# Patient Record
Sex: FEMALE | Race: WHITE | NOT HISPANIC OR LATINO | Employment: OTHER | URBAN - METROPOLITAN AREA
[De-identification: names, ages, dates, MRNs, and addresses within clinical notes are randomized per-mention and may not be internally consistent; named-entity substitution may affect disease eponyms.]

---

## 2017-01-31 ENCOUNTER — RECORDS - HEALTHEAST (OUTPATIENT)
Dept: LAB | Facility: CLINIC | Age: 82
End: 2017-01-31

## 2017-01-31 LAB
CHOLEST SERPL-MCNC: 230 MG/DL
FASTING STATUS PATIENT QL REPORTED: NO
HDLC SERPL-MCNC: 44 MG/DL
LDLC SERPL CALC-MCNC: 151 MG/DL
TRIGL SERPL-MCNC: 176 MG/DL

## 2018-03-05 ASSESSMENT — MIFFLIN-ST. JEOR: SCORE: 967.98

## 2018-03-06 ENCOUNTER — SURGERY - HEALTHEAST (OUTPATIENT)
Dept: SURGERY | Facility: HOSPITAL | Age: 83
End: 2018-03-06

## 2018-03-06 ENCOUNTER — ANESTHESIA - HEALTHEAST (OUTPATIENT)
Dept: SURGERY | Facility: HOSPITAL | Age: 83
End: 2018-03-06

## 2018-07-23 ENCOUNTER — RECORDS - HEALTHEAST (OUTPATIENT)
Dept: ADMINISTRATIVE | Facility: OTHER | Age: 83
End: 2018-07-23

## 2018-07-23 ENCOUNTER — RECORDS - HEALTHEAST (OUTPATIENT)
Dept: LAB | Facility: CLINIC | Age: 83
End: 2018-07-23

## 2018-07-23 LAB
CHOLEST SERPL-MCNC: 248 MG/DL
FASTING STATUS PATIENT QL REPORTED: ABNORMAL
HDLC SERPL-MCNC: 49 MG/DL
LDLC SERPL CALC-MCNC: 169 MG/DL
TRIGL SERPL-MCNC: 152 MG/DL
TSH SERPL DL<=0.005 MIU/L-ACNC: 2.56 UIU/ML (ref 0.3–5)

## 2018-07-24 LAB — BACTERIA SPEC CULT: NO GROWTH

## 2018-09-12 ENCOUNTER — HOSPITAL ENCOUNTER (OUTPATIENT)
Dept: MAMMOGRAPHY | Facility: CLINIC | Age: 83
Discharge: HOME | End: 2018-09-12
Attending: FAMILY MEDICINE

## 2018-09-12 DIAGNOSIS — Z12.31 VISIT FOR SCREENING MAMMOGRAM: ICD-10-CM

## 2019-04-03 ENCOUNTER — SURGERY - HEALTHEAST (OUTPATIENT)
Dept: GASTROENTEROLOGY | Facility: HOSPITAL | Age: 84
End: 2019-04-03

## 2019-04-03 ASSESSMENT — MIFFLIN-ST. JEOR: SCORE: 946.44

## 2019-11-19 ENCOUNTER — RECORDS - HEALTHEAST (OUTPATIENT)
Dept: LAB | Facility: CLINIC | Age: 84
End: 2019-11-19

## 2019-11-19 LAB
ANION GAP SERPL CALCULATED.3IONS-SCNC: 10 MMOL/L (ref 5–18)
BUN SERPL-MCNC: 18 MG/DL (ref 8–28)
CALCIUM SERPL-MCNC: 10.6 MG/DL (ref 8.5–10.5)
CHLORIDE BLD-SCNC: 105 MMOL/L (ref 98–107)
CO2 SERPL-SCNC: 26 MMOL/L (ref 22–31)
CREAT SERPL-MCNC: 0.94 MG/DL (ref 0.6–1.1)
GFR SERPL CREATININE-BSD FRML MDRD: 57 ML/MIN/1.73M2
GLUCOSE BLD-MCNC: 102 MG/DL (ref 70–125)
POTASSIUM BLD-SCNC: 4.3 MMOL/L (ref 3.5–5)
SODIUM SERPL-SCNC: 141 MMOL/L (ref 136–145)
TSH SERPL DL<=0.005 MIU/L-ACNC: 3.8 UIU/ML (ref 0.3–5)

## 2019-11-29 ENCOUNTER — RECORDS - HEALTHEAST (OUTPATIENT)
Dept: ADMINISTRATIVE | Facility: OTHER | Age: 84
End: 2019-11-29

## 2019-11-29 ENCOUNTER — AMBULATORY - HEALTHEAST (OUTPATIENT)
Dept: CARDIOLOGY | Facility: CLINIC | Age: 84
End: 2019-11-29

## 2019-12-04 ENCOUNTER — OFFICE VISIT - HEALTHEAST (OUTPATIENT)
Dept: CARDIOLOGY | Facility: CLINIC | Age: 84
End: 2019-12-04

## 2019-12-04 ENCOUNTER — COMMUNICATION - HEALTHEAST (OUTPATIENT)
Dept: CARDIOLOGY | Facility: CLINIC | Age: 84
End: 2019-12-04

## 2019-12-04 DIAGNOSIS — I49.3 FREQUENT PVCS: ICD-10-CM

## 2019-12-04 RX ORDER — MULTIVIT-MIN/IRON FUM/FOLIC AC 7.5 MG-4
1 TABLET ORAL DAILY
Status: SHIPPED | COMMUNITY
Start: 2019-08-12

## 2019-12-04 RX ORDER — DIAZEPAM 5 MG
5-10 TABLET ORAL ONCE
Qty: 2 TABLET | Refills: 0 | Status: SHIPPED | OUTPATIENT
Start: 2019-12-04 | End: 2024-01-08

## 2019-12-04 ASSESSMENT — MIFFLIN-ST. JEOR: SCORE: 977.06

## 2019-12-05 ENCOUNTER — HOSPITAL ENCOUNTER (OUTPATIENT)
Dept: CARDIOLOGY | Facility: HOSPITAL | Age: 84
Discharge: HOME | End: 2019-12-05
Attending: INTERNAL MEDICINE

## 2019-12-05 DIAGNOSIS — I49.3 FREQUENT PVCS: ICD-10-CM

## 2019-12-09 ENCOUNTER — COMMUNICATION - HEALTHEAST (OUTPATIENT)
Dept: CARDIOLOGY | Facility: CLINIC | Age: 84
End: 2019-12-09

## 2019-12-18 ENCOUNTER — COMMUNICATION - HEALTHEAST (OUTPATIENT)
Dept: CARDIOLOGY | Facility: CLINIC | Age: 84
End: 2019-12-18

## 2019-12-31 ENCOUNTER — HOSPITAL ENCOUNTER (OUTPATIENT)
Dept: MRI IMAGING | Facility: HOSPITAL | Age: 84
Discharge: HOME | End: 2019-12-31
Attending: INTERNAL MEDICINE

## 2020-01-08 ENCOUNTER — COMMUNICATION - HEALTHEAST (OUTPATIENT)
Dept: CARDIOLOGY | Facility: HOSPITAL | Age: 85
End: 2020-01-08

## 2020-01-09 ENCOUNTER — HOSPITAL ENCOUNTER (OUTPATIENT)
Dept: MRI IMAGING | Facility: HOSPITAL | Age: 85
Discharge: HOME | End: 2020-01-09
Attending: INTERNAL MEDICINE

## 2020-01-09 DIAGNOSIS — I49.3 FREQUENT PVCS: ICD-10-CM

## 2020-01-09 LAB
ATRIAL RATE - MUSE: 69 BPM
CREAT BLD-MCNC: 0.9 MG/DL (ref 0.6–1.1)
DIASTOLIC BLOOD PRESSURE - MUSE: NORMAL
GFR SERPL CREATININE-BSD FRML MDRD: 60 ML/MIN/1.73M2
INTERPRETATION ECG - MUSE: NORMAL
P AXIS - MUSE: 38 DEGREES
PR INTERVAL - MUSE: 180 MS
QRS DURATION - MUSE: 74 MS
QT - MUSE: 384 MS
QTC - MUSE: 411 MS
R AXIS - MUSE: -1 DEGREES
SYSTOLIC BLOOD PRESSURE - MUSE: NORMAL
T AXIS - MUSE: 42 DEGREES
VENTRICULAR RATE- MUSE: 69 BPM

## 2020-01-10 LAB
ATRIAL RATE - MUSE: 75 BPM
CCTA EJECTION FRACTION: 57 %
CCTA INTERVENTRICULAR SETPUM: 0.6 CM (ref 0.6–1.1)
CCTA LEFT INTERNAL DIMENSION IN SYSTOLE: 2.4 CM (ref 2.1–4)
CCTA LEFT VENTRICULAR INTERNAL DIMENSION IN DIASTOLE: 3.6 CM (ref 3.5–6)
CCTA LEFT VENTRICULAR MASS: 53.79 G
CCTA POSTERIOR WALL: 0.6 CM (ref 0.6–1.1)
DIASTOLIC BLOOD PRESSURE - MUSE: NORMAL
INTERPRETATION ECG - MUSE: NORMAL
MR CARDIAC LEFT VENTRIAL CARDIAC INDEX: 2 L/MIN/M2 (ref 1.75–3.8)
MR CARDIAC LEFT VENTRICAL CARDIAC OUTPUT: 3.1 L/MIN (ref 2.8–8.8)
MR CARDIAC LEFT VENTRICULAR DIASTOLIC VOLUME INDEX: 55.3 ML/M2 (ref 41–81)
MR CARDIAC LEFT VENTRICULAR MASS INDEX: 47.68 G/M2 (ref 63–95)
MR CARDIAC LEFT VENTRICULAR MASS: 75 G (ref 75–175)
MR CARDIAC LEFT VENTRICULAR STROKE VOLUME INDEX: 30.51 ML/M2 (ref 26–56)
MR CARDIAC LEFT VENTRICULAR SYSTOLIC VOLUME INDEX: 24.79 ML/M2 (ref 12–20)
MR EJECTION FRACTION: 55.17 %
MR HEIGHT: 1.55 M
MR LEFT VENTRICULAR DYSTOLIC VOLUMEN: 87 ML (ref 52–141)
MR LEFT VENTRICULAR STROKE VOLUMEN: 48 ML (ref 33–97)
MR LEFT VENTRICULAR SYSTOLIC VOLUME: 39 ML (ref 13–51)
MR WEIGHT: 59 KG
P AXIS - MUSE: 35 DEGREES
PR INTERVAL - MUSE: 174 MS
QRS DURATION - MUSE: 80 MS
QT - MUSE: 410 MS
QTC - MUSE: 457 MS
R AXIS - MUSE: 0 DEGREES
SYSTOLIC BLOOD PRESSURE - MUSE: NORMAL
T AXIS - MUSE: 43 DEGREES
VENTRICULAR RATE- MUSE: 75 BPM

## 2020-11-03 ENCOUNTER — COMMUNICATION - HEALTHEAST (OUTPATIENT)
Dept: CARDIOLOGY | Facility: CLINIC | Age: 85
End: 2020-11-03

## 2020-11-04 ENCOUNTER — COMMUNICATION - HEALTHEAST (OUTPATIENT)
Dept: CARDIOLOGY | Facility: CLINIC | Age: 85
End: 2020-11-04

## 2020-11-04 ENCOUNTER — OFFICE VISIT - HEALTHEAST (OUTPATIENT)
Dept: CARDIOLOGY | Facility: CLINIC | Age: 85
End: 2020-11-04

## 2020-11-04 DIAGNOSIS — E78.2 MIXED HYPERLIPIDEMIA: ICD-10-CM

## 2020-11-04 DIAGNOSIS — I49.3 PVC'S (PREMATURE VENTRICULAR CONTRACTIONS): ICD-10-CM

## 2020-11-04 DIAGNOSIS — R00.1 BRADYCARDIA: ICD-10-CM

## 2020-11-04 LAB
CHOLEST SERPL-MCNC: 238 MG/DL
FASTING STATUS PATIENT QL REPORTED: NO
HDLC SERPL-MCNC: 47 MG/DL
LDLC SERPL CALC-MCNC: 156 MG/DL
MAGNESIUM SERPL-MCNC: 2.3 MG/DL (ref 1.8–2.6)
TRIGL SERPL-MCNC: 177 MG/DL

## 2020-11-04 ASSESSMENT — MIFFLIN-ST. JEOR: SCORE: 977.06

## 2020-11-06 ENCOUNTER — COMMUNICATION - HEALTHEAST (OUTPATIENT)
Dept: CARDIOLOGY | Facility: CLINIC | Age: 85
End: 2020-11-06

## 2020-11-11 ENCOUNTER — HOSPITAL ENCOUNTER (OUTPATIENT)
Dept: CT IMAGING | Facility: CLINIC | Age: 85
Discharge: HOME | End: 2020-11-11
Attending: INTERNAL MEDICINE

## 2020-11-11 DIAGNOSIS — E78.2 MIXED HYPERLIPIDEMIA: ICD-10-CM

## 2020-11-11 LAB
CV CALCIUM SCORE AGATSTON LM: 0
CV CALCIUM SCORING AGATSON LAD: 102
CV CALCIUM SCORING AGATSTON CX: 0
CV CALCIUM SCORING AGATSTON RCA: 0
CV CALCIUM SCORING AGATSTON TOTAL: 102

## 2020-11-12 ENCOUNTER — AMBULATORY - HEALTHEAST (OUTPATIENT)
Dept: CARDIOLOGY | Facility: CLINIC | Age: 85
End: 2020-11-12

## 2020-11-12 DIAGNOSIS — E78.2 MIXED HYPERLIPIDEMIA: ICD-10-CM

## 2020-11-12 RX ORDER — UBIDECARENONE 100 MG
100 CAPSULE ORAL 2 TIMES DAILY
Refills: 0 | Status: SHIPPED | COMMUNITY
Start: 2020-11-12

## 2020-11-30 ENCOUNTER — RECORDS - HEALTHEAST (OUTPATIENT)
Dept: LAB | Facility: CLINIC | Age: 85
End: 2020-11-30

## 2020-11-30 LAB
ALBUMIN SERPL-MCNC: 4.2 G/DL (ref 3.5–5)
ALP SERPL-CCNC: 95 U/L (ref 45–120)
ALT SERPL W P-5'-P-CCNC: 21 U/L (ref 0–45)
ANION GAP SERPL CALCULATED.3IONS-SCNC: 7 MMOL/L (ref 5–18)
AST SERPL W P-5'-P-CCNC: 19 U/L (ref 0–40)
BILIRUB SERPL-MCNC: 0.5 MG/DL (ref 0–1)
BUN SERPL-MCNC: 12 MG/DL (ref 8–28)
CALCIUM SERPL-MCNC: 9.9 MG/DL (ref 8.5–10.5)
CHLORIDE BLD-SCNC: 106 MMOL/L (ref 98–107)
CHOLEST SERPL-MCNC: 128 MG/DL
CO2 SERPL-SCNC: 27 MMOL/L (ref 22–31)
CREAT SERPL-MCNC: 0.8 MG/DL (ref 0.6–1.1)
FASTING STATUS PATIENT QL REPORTED: YES
GFR SERPL CREATININE-BSD FRML MDRD: >60 ML/MIN/1.73M2
GLUCOSE BLD-MCNC: 120 MG/DL (ref 70–125)
HDLC SERPL-MCNC: 45 MG/DL
LDLC SERPL CALC-MCNC: 60 MG/DL
POTASSIUM BLD-SCNC: 4.3 MMOL/L (ref 3.5–5)
PROT SERPL-MCNC: 6.7 G/DL (ref 6–8)
SODIUM SERPL-SCNC: 140 MMOL/L (ref 136–145)
TRIGL SERPL-MCNC: 114 MG/DL

## 2021-03-11 ENCOUNTER — RECORDS - HEALTHEAST (OUTPATIENT)
Dept: LAB | Facility: CLINIC | Age: 86
End: 2021-03-11

## 2021-03-11 LAB
ERYTHROCYTE [SEDIMENTATION RATE] IN BLOOD BY WESTERGREN METHOD: 8 MM/HR (ref 0–20)
TSH SERPL DL<=0.005 MIU/L-ACNC: 4.78 UIU/ML (ref 0.3–5)

## 2021-05-28 ENCOUNTER — RECORDS - HEALTHEAST (OUTPATIENT)
Dept: ADMINISTRATIVE | Facility: CLINIC | Age: 86
End: 2021-05-28

## 2021-06-01 VITALS — BODY MASS INDEX: 24.17 KG/M2 | WEIGHT: 128 LBS | HEIGHT: 61 IN

## 2021-06-02 ENCOUNTER — RECORDS - HEALTHEAST (OUTPATIENT)
Dept: ADMINISTRATIVE | Facility: CLINIC | Age: 86
End: 2021-06-02

## 2021-06-02 VITALS — WEIGHT: 125 LBS | HEIGHT: 61 IN | BODY MASS INDEX: 23.6 KG/M2

## 2021-06-04 VITALS
SYSTOLIC BLOOD PRESSURE: 106 MMHG | BODY MASS INDEX: 24.55 KG/M2 | HEART RATE: 44 BPM | RESPIRATION RATE: 16 BRPM | HEIGHT: 61 IN | WEIGHT: 130 LBS | DIASTOLIC BLOOD PRESSURE: 56 MMHG

## 2021-06-04 NOTE — TELEPHONE ENCOUNTER
Called and spoke with the patient. Discussion of her Holter monitor results. Pt reports MRI coming up on 12/31/19. Pt raises concerns and needs input from provider.    Patient has down payment on a trip to [place]. The trip will be mid Feb to Mid March for an entire month. A large sum of money. She has until 12/21/19 to cancel and not lose her deposit. She is wondering if she should cancel.     Writer will call MRI to see if can obtain/get scheduled sooner at [facility]. If not at [facility] then will check with [facility].     Dr Banks I will work on obtaining the MRI ST sooner, if taht is possible. Patient is anxious and wanting recommendation in regards to her upcoming trip. Please advise. THank you, NEEMA,RN

## 2021-06-04 NOTE — TELEPHONE ENCOUNTER
Spoke with the patient and informed of input from [facility]. Pt verbalized understanding. Will continue to work on sooner MRI ST. NEEMARn

## 2021-06-04 NOTE — TELEPHONE ENCOUNTER
===View-only below this line===  ----- Message -----  From: Sarah Banks MD  Sent: 12/11/2019   2:25 PM CST  To: Tyrone Alan RN    I think that she probably will be able to go. Can she buy trip insurance?    EG  ----

## 2021-06-04 NOTE — TELEPHONE ENCOUNTER
----- Message from Kati Alarcon sent at 12/17/2019  4:11 PM CST -----  THIS CAME ON THE HIS FAX FOR  TO FILL OUT.

## 2021-06-04 NOTE — TELEPHONE ENCOUNTER
Update from scheduling, unable to reschedule patient for a sooner MRI ST. Will have to connect with [place] to see if possible to be scheduled sooner through a [place] facility. Unable to schedule sooner. NEEMA,RN

## 2021-06-04 NOTE — TELEPHONE ENCOUNTER
"Phone call to patient - informed her of Dr. Banks's Rx order for Valium to take prior to cardiac MRI - reviewed instructions that she should take 1-2 (5mg) tabs at time of appt and will need  for appt - patient verbalized understanding and confirmed preferred pharmacy.    Left detailed message for [facility] pharmacist with Dr. Banks's verbal order for Valium 5mg tabs w/instructions to take 1-2 tabs po \"once in imaging for anxiety - bring Rx to MRI appt , don't take prior to arriving - will need \" - requested call back to confirm receipt of message.  mg  "
Rec'd return call from [facility] pharmacist confirming receipt of Dr. Banks's verbal order for Valium Rx - no request for further information rec'd.  mg  
I will SWITCH the dose or number of times a day I take the medications listed below when I get home from the hospital:  None

## 2021-06-04 NOTE — TELEPHONE ENCOUNTER
Called and spoke with central scheduling and was instructed and forwarded to follow up with [facility]  specifically. Will call [facility]  to see if an alternate date/time available at [facility]. Called and spoke with , she will look into availability and return to writer once further information obtained. NEEMARn

## 2021-06-04 NOTE — TELEPHONE ENCOUNTER
----- Message from Lauren Murray sent at 12/9/2019  1:13 PM CST -----  Regarding: [facility] Pt  General phone call:    Caller: Tayla   Primary cardiologist: [facility]  Detailed reason for call: Tayla states she recently had a monitor on and was curious if [facility] was able to look at the results. Patient has rented out a home in [place] in February and is wondering [facility] recommendations if she will be able to go to [place] or if she needs to cancel her trip.    Best phone number: [phone]  Best time to contact: any  Ok to leave a detailed message? yes  Device? no    Additional Info:

## 2021-06-04 NOTE — PATIENT INSTRUCTIONS - [facility]
- You have frequent PVCs, we want to know how many and why.    - 24 hour monitor to see how many extra beats you are having    - Cardiac MR stress test to check the blood flow to your heart and look for inflammation of the heart muscle.     - Will call with results and recommendations

## 2021-06-05 VITALS
HEART RATE: 85 BPM | RESPIRATION RATE: 16 BRPM | WEIGHT: 130 LBS | SYSTOLIC BLOOD PRESSURE: 120 MMHG | BODY MASS INDEX: 24.55 KG/M2 | HEIGHT: 61 IN | DIASTOLIC BLOOD PRESSURE: 80 MMHG

## 2021-06-07 ENCOUNTER — COMMUNICATION - HEALTHEAST (OUTPATIENT)
Dept: CARDIOLOGY | Facility: CLINIC | Age: 86
End: 2021-06-07

## 2021-06-07 DIAGNOSIS — E78.2 MIXED HYPERLIPIDEMIA: ICD-10-CM

## 2021-06-08 RX ORDER — ATORVASTATIN CALCIUM 20 MG/1
20 TABLET, FILM COATED ORAL AT BEDTIME
Qty: 90 TABLET | Refills: 2 | Status: SHIPPED | OUTPATIENT
Start: 2021-06-08 | End: 2022-04-11

## 2021-06-12 NOTE — TELEPHONE ENCOUNTER
Wellness Screening Tool  Symptom Screening:  Do you have one of the following NEW symptoms:    Fever (subjective or >100.0)?  No    A new cough?  No    Shortness of breath?  No     Chills? No     New loss of taste or smell? No     Generalized body aches? No     New persistent headache? No     New sore throat? No     Nausea, vomiting, or diarrhea?  No    Within the past 2 weeks, have you been exposed to someone with a known positive illness below:    COVID-19 (known or suspected)?  No    Chicken pox?  No    Mealses?  No    Pertussis?  No    Patient notified of visitor policy- They may have one person accompany them to their appointment, but they will need to wear a mask and will be screened upon arrival for symptoms: Yes  Pt informed to wear a mask: Yes  Pt notified if they develop any symptoms listed above, prior to their appointment, they are to call the clinic directly at [phone] for further instructions.  Yes  Patient's appointment status: 11/4

## 2021-06-12 NOTE — TELEPHONE ENCOUNTER
Spoke with pt and updated her on LDL and waiting for Ca Score testing to be done next week. Also updated Magnesium and how well that is. Pt agreed with plan.

## 2021-06-12 NOTE — TELEPHONE ENCOUNTER
----- Message from Yolanda Phillips sent at 11/6/2020 10:57 AM CST -----      Caller: Patient  Primary cardiologist: Dr. Banks    Detailed reason for call: Patient is wondering about her labs and if the results are back yet    Best phone number: [phone]  Best time to contact: today  Ok to leave a detailed message? yes  Device? no

## 2021-06-13 NOTE — PROGRESS NOTES
===View-only below this line===  ----- Message -----  From: Sarah Banks MD  Sent: 11/12/2020   9:43 AM CST  To: Tyrone Alan RN    CTA shows a calcium score of > 100 (102), which does put her in a high risk group from a cardiovascular standpoint. Based on this and her high LDL I recommend we start atorvastatin 40 mg at bedtime and a baby aspirin 81 mg daily.  She can use Coenzyme Q10 100mg two times a day if she notes muscle aches with the medication. Plan f/u lipid profile in 3 months. We had already discussed diet and exercise when she was in the clinic. Thanks, EG

## 2021-06-16 PROBLEM — R00.1 BRADYCARDIA: Status: ACTIVE | Noted: 2020-11-04

## 2021-06-16 PROBLEM — I49.3 PVC'S (PREMATURE VENTRICULAR CONTRACTIONS): Status: ACTIVE | Noted: 2020-11-04

## 2021-06-16 NOTE — ANESTHESIA POSTPROCEDURE EVALUATION
Patient: Tayla Benz  ESOPHAGOGASTRODUODENOSCOPY WITH DILATATION  Anesthesia type: MAC    Patient location: Phase II Recovery  Last vitals:   Vitals:    03/06/18 1145   BP: 120/68   Pulse: 91   Resp:    Temp:    SpO2: 95%     Post vital signs: stable  Level of consciousness: awake and responds to simple questions  Post-anesthesia pain: pain controlled  Post-anesthesia nausea and vomiting: no  Pulmonary: unassisted, return to baseline  Cardiovascular: stable and blood pressure at baseline  Hydration: adequate  Anesthetic events: no    QCDR Measures:  ASA# 11 - Marcela-op Cardiac Arrest: ASA11B - Patient did NOT experience unanticipated cardiac arrest  ASA# 12 - Marcela-op Mortality Rate: ASA12B - Patient did NOT die  ASA# 13 - PACU Re-Intubation Rate: ASA13B - Patient did NOT require a new airway mgmt  ASA# 10 - Composite Anes Safety: ASA10A - No serious adverse event    Additional Notes:

## 2021-06-16 NOTE — ANESTHESIA CARE TRANSFER NOTE
Last vitals:   Vitals:    03/06/18 1054   BP: 111/61   Pulse: (!) 101   Resp: 18   Temp: 36.6  C (97.9  F)   SpO2: 99%     Patient's level of consciousness is drowsy  Spontaneous respirations: yes  Maintains airway independently: yes  Dentition unchanged: yes  Oropharynx: oropharynx clear of all foreign objects    QCDR Measures:  ASA# 20 - Surgical Safety Checklist: WHO surgical safety checklist completed prior to induction  PQRS# 430 - Adult PONV Prevention: 4558F - Pt received => 2 anti-emetic agents (different classes) preop & intraop  ASA# 8 - Peds PONV Prevention: NA - Not pediatric patient, not GA or 2 or more risk factors NOT present  PQRS# 424 - Marcela-op Temp Management: 4559F - At least one body temp DOCUMENTED => 35.5C or 95.9F within required timeframe  PQRS# 426 - PACU Transfer Protocol: - Transfer of care checklist used  ASA# 14 - Acute Post-op Pain: ASA14B - Patient did NOT experience pain >= 7 out of 10

## 2021-06-16 NOTE — ANESTHESIA PREPROCEDURE EVALUATION
Anesthesia Evaluation      Patient summary reviewed     Airway   Mallampati: II  Neck ROM: full   Pulmonary - negative ROS    breath sounds clear to auscultation                         Cardiovascular   (+) , hypercholesterolemia,     Rate: normal,         Neuro/Psych    (+) neuromuscular disease (CTS),      Endo/Other    (+) hypothyroidism, arthritis,      GI/Hepatic/Renal    (+) GERD well controlled,        Other findings:     NPO  Essentially nl Echo--good LVF  Dysphagia,  Chronic hip problems---stable      Dental - normal exam                        Anesthesia Plan  Planned anesthetic: MAC    ASA 2     Anesthetic plan and risks discussed with: patient  Anesthesia plan special considerations: antiemetics,   Post-op plan: routine recovery

## 2021-06-25 NOTE — TELEPHONE ENCOUNTER
PC to patient and discussion. Recommendations reviewed. Pt will reduce her Atorvastatin. New rx sent to verified pharmacy. Pt will have lab drawn at Dr. Angel' office. Lab order routed to physician/office. Call if further questions. NEEMA,RN

## 2021-06-25 NOTE — TELEPHONE ENCOUNTER
PC with patient and discussion. Pt has been on the statin 40 mg daily since November. She researched, the medication and found to be possibly the cause of her foot discomfort; sharp pains in her foot, during the night. She now taking the CoQ-10 once daily, and then the statin 40 mg every other day in the evening. Pt has had resolution of her symptoms.   Pt would like to know if continue on this regimen or take Atorvastatin 20 mg every evening? Also, she wonders if your cholesterol can get too low and cause symptoms? Will forward to provider for review.  Dr. Banks recommendations? CMM,RN

## 2021-06-25 NOTE — TELEPHONE ENCOUNTER
----- Message from Lilli Quezada sent at 6/7/2021 10:48 AM CDT -----  Regarding: EMG pt  General phone call:    Caller: Tayla  Primary cardiologist: EM[facility]  Detailed reason for call: Two weeks ago patient began having sharp pains in her feet and legs.  She reduced the dosage to every other day and she's not had the pains.    Best phone number: [phone]  Best time to contact: any  Ok to leave a detailed message? yes  Device? no    Additional Info:

## 2021-06-25 NOTE — TELEPHONE ENCOUNTER
===View-only below this line===  ----- Message -----  From: Sarah Banks MD  Sent: 6/7/2021   4:02 PM CDT  To: Tyrone Alan RN    Let's have her do 20 mg daily and repeat lipids in 4 weeks. Low cholesterol does not have symptoms.     EG

## 2021-06-28 NOTE — PROGRESS NOTES
Progress Notes by Sarah Banks MD at 12/4/2019 10:30 AM     Author: Sarah Banks MD Service: -- Author Type: Physician    Filed: 12/4/2019  1:10 PM Encounter Date: 12/4/2019 Status: Signed    : Sarah Banks MD (Physician)         Thank you, Dr. Guadarrama, for asking the [facility] team to see Ms. Tayla Benz to evaluate heart disease.      Assessment/Recommendations   Assessment/Plan:    Frequent PVCs - asymptomatic. Will check a 24 hour holter to quantify and send for a stress MRI to rule out ischemia and myocardial inflammation.       History of Present Illness/Subjective    Ms. Tayla Benz is a robust and active 84 y.o. female with hypothyroidism on synthroid and untreated dyslipidemia who was found to have PVC driven bradycardia on a pre-operative EKG for cataract surgery. Tayla walks regularly on a treadmill without difficulty. She denies any change in exercise tolerance over the past year. She was seen in the ER for vertigo in September and had a normal heart rate then. She reports using an antihystamine for a period of time then but stopped it as it made her anxious. Tayla underwent her cataract surgery 2 days ago without trouble. She denies pnd/orthopnea, edema, syncope, chest pain or dyspnea.     EKG from Dr. Guadarrama shows NSR with frequent monomorphic PVCs. She did have an echo about a year ago for an incidental murmur but nothing really showed up.        Physical Examination Review of Systems   Vitals:    12/04/19 1103   BP: 106/56   Pulse: (!) 44   Resp: 16     Body mass index is 24.56 kg/m .  Wt Readings from Last 3 Encounters:   12/04/19 130 lb (59 kg)   09/11/19 125 lb (56.7 kg)   04/03/19 125 lb (56.7 kg)     [unfilled]  General Appearance:   no distress, normal body habitus   ENT/Mouth: membranes moist, no oral lesions or bleeding gums.      EYES:  no scleral icterus, normal conjunctivae   Neck: no carotid bruits or thyromegaly   Chest/Lungs:    lungs are clear to auscultation, no rales or wheezing, no sternal scar, equal chest wall expansion    Cardiovascular:   Regular. Normal first and second heart sounds with faint systolic murmur. No rubs or gallops. The right radial, dorsalis pedis and posterior tibial pulses are intact.  The left radial, dorsalis pedis and posterior tibial pulses are intact. Jugular venous pressure flat, no edema bilaterally    Abdomen:  no organomegaly, masses, bruits, or tenderness; bowel sounds are present   Extremities: no cyanosis or clubbing   Skin: no xanthelasma, warm.    Neurologic: normal  bilateral, no tremors     Psychiatric: alert and oriented x3, calm     General: WNL  Eyes: Visual Distubance  Ears/Nose/Throat: WNL  Lungs: Cough  Heart: Irregular Heartbeat  Stomach: WNL  Bladder: WNL  Muscle/Joints: WNL  Skin: WNL  Nervous System: WNL  Mental Health: WNL     Blood: WNL     Medical History  Surgical History Family History Social History   Past Medical History:   Diagnosis Date     Acute bronchitis      Root esophagus      BPPV (benign paroxysmal positional vertigo)      Carpal tunnel syndrome      Congenital hip dysplasia      Dysphasia      GERD (gastroesophageal reflux disease)      Bangladeshi measles      History of transfusion     hip surgery years ago     Hypothyroidism      Murmur      PVC (premature ventricular contraction)      Shingles     Past Surgical History:   Procedure Laterality Date     CHOLECYSTECTOMY       ENDOMETRIAL BIOPSY  1995    for post menopausal bleeding     FOOT SURGERY Right      HIP SURGERY Right     X4     IA ESOPHAGOGASTRODUODENOSCOPY TRANSORAL DIAGNOSTIC N/A 3/6/2018    Procedure: ESOPHAGOGASTRODUODENOSCOPY WITH DILATATION;  Surgeon: Eleno Moyer MD;  Location: [facility] OR;  Service: Gastroenterology     IA ESOPHAGOGASTRODUODENOSCOPY TRANSORAL DIAGNOSTIC N/A 4/3/2019    Procedure: UPPER ENDOSCOPY WITH DILATION;  Surgeon: Eleno Moyer MD;  Location: [facility] GI;   Service: Gastroenterology    Family History   Problem Relation Age of Onset     Heart attack Sister     Social History     Socioeconomic History     Marital status:      Spouse name: Not on file     Number of children: Not on file     Years of education: Not on file     Highest education level: Not on file   Occupational History     Not on file   Social Needs     Financial resource strain: Not on file     Food insecurity:     Worry: Not on file     Inability: Not on file     Transportation needs:     Medical: Not on file     Non-medical: Not on file   Tobacco Use     Smoking status: Never Smoker     Smokeless tobacco: Never Used   Substance and Sexual Activity     Alcohol use: Yes     Comment: very rare     Drug use: No     Sexual activity: Not on file   Lifestyle     Physical activity:     Days per week: Not on file     Minutes per session: Not on file     Stress: Not on file   Relationships     Social connections:     Talks on phone: Not on file     Gets together: Not on file     Attends Jehovah's witness service: Not on file     Active member of club or organization: Not on file     Attends meetings of clubs or organizations: Not on file     Relationship status: Not on file     Intimate partner violence:     Fear of current or ex partner: Not on file     Emotionally abused: Not on file     Physically abused: Not on file     Forced sexual activity: Not on file   Other Topics Concern     Not on file   Social History Narrative     Not on file          Medications  Allergies   Current Outpatient Medications   Medication Sig Dispense Refill     b complex vitamins tablet        cod liver oil cap Take 1 capsule by mouth daily.       fluocinonide (LIDEX) 0.05 % cream Apply 1 application topically as needed.        levothyroxine (SYNTHROID, LEVOTHROID) 25 MCG tablet Take 25 mcg by mouth daily.       loratadine (CLARITIN) 10 mg tablet Take 10 mg by mouth daily.       LUTEIN ORAL as needed.        meclizine (ANTIVERT) 25 mg  tablet Take 1 tablet (25 mg total) by mouth 3 (three) times a day as needed. 30 tablet 0     multivitamin with minerals (THERA-M) 9 mg iron-400 mcg Tab tablet Take 1 tablet by mouth daily.       multivitamin with minerals tablet        multivitamin with minerals tablet Take 1 tablet by mouth daily.       omega 3-dha-epa-fish oil 1,000 mg (120 mg-180 mg) cap        OMEGA-3/DHA/EPA/FISH OIL (FISH OIL-OMEGA-3 FATTY ACIDS) 300-1,000 mg capsule Take 2 g by mouth daily.       omeprazole (PRILOSEC) 20 MG capsule Take 20 mg by mouth daily.       diazePAM (VALIUM) 5 MG tablet Take 1-2 tablets (5-10 mg total) by mouth once in imaging for anxiety. Fill at preferred pharmacy and bring to MRI appointment. Do not take prior to arriving. You will need a  to bring you home after your appointment. 2 tablet 0     psyllium with dextrose (METAMUCIL JAR) powder Take by mouth daily.       ranitidine (ZANTAC) 150 MG capsule Take 150 mg by mouth 2 (two) times a day.       No current facility-administered medications for this visit.     No Known Allergies      Lab Results    Chemistry/lipid CBC Cardiac Enzymes/BNP/TSH/INR   Lab Results   Component Value Date    CHOL 248 (H) 07/23/2018    HDL 49 (L) 07/23/2018    LDLCALC 169 (H) 07/23/2018    TRIG 152 (H) 07/23/2018    CREATININE 0.94 11/19/2019    BUN 18 11/19/2019    K 4.3 11/19/2019     11/19/2019     11/19/2019    CO2 26 11/19/2019    Lab Results   Component Value Date    WBC 8.7 11/25/2010    HGB 11.0 (L) 11/25/2010    HCT 31.7 (L) 11/25/2010    MCV 96 11/25/2010     11/25/2010     11/25/2010    Lab Results   Component Value Date    TROPONINI 0.02 11/23/2010    TSH 3.80 11/19/2019    INR 1.02 11/23/2010

## 2021-06-29 NOTE — PROGRESS NOTES
Progress Notes by Sarah Banks MD at 11/4/2020 10:10 AM     Author: Sarah Banks MD Service: -- Author Type: Physician    Filed: 11/4/2020 10:48 AM Encounter Date: 11/4/2020 Status: Signed    : Sarah Banks MD (Physician)         Thank you, Dr. Guadarrama, for asking the [facility] team to see Ms. Tayla Benz to evaluate heart disease.      Assessment/Recommendations   Assessment/Plan:    PVCs - burden 7% on monitor last year, but asymptomatic and EF normal without scar on MRI. She continues to do well. Will check a magnesium today.     Hyperlipidemia -  in 2018. Recheck today. Diet discussed. Will get coronary calcium score to help risk stratify before starting statin.    Risk stratification - low fat, mediterranean diet and 150 minutes of exercise per week recommended.       History of Present Illness/Subjective    Ms. Tayla Benz is a 84 y.o. active female with hypothyroidism on synthroid and untreated dyslipidemia who was found to have PVC driven bradycardia on a pre-operative EKG for cataract surgery. EKG from Dr. Guadarrama shows NSR with frequent monomorphic PVCs. She did have an echo a couple of years ago for an incidental murmur but nothing really showed up. Cardial MRI showed EF 55% and no scar/inflammation. Last LDL was 169.    Tayla Benz returns for f/u today. She continues to do well, cleaning her own home and her kids homes, going up/down the stairs frequently. There is no chest pain, dyspnea, pnd/orthopnea, edema, palpitations dizziness or syncope. Her  had a stroke in March and he is recovering slowly.         Physical Examination Review of Systems   Vitals:    11/04/20 1015   BP: 120/80   Pulse: 85   Resp: 16     Body mass index is 24.56 kg/m .  Wt Readings from Last 3 Encounters:   11/04/20 130 lb (59 kg)   12/04/19 130 lb (59 kg)   09/11/19 125 lb (56.7 kg)     [unfilled]  General Appearance:   no distress, normal body habitus    ENT/Mouth: membranes moist, no oral lesions or bleeding gums.      EYES:  no scleral icterus, normal conjunctivae   Neck: no carotid bruits or thyromegaly   Chest/Lungs:   lungs are clear to auscultation, no rales or wheezing, no sternal scar, equal chest wall expansion    Cardiovascular:   Regular. Normal first and second heart sounds with no murmurs, rubs, or gallops. The right radial, dorsalis pedis and posterior tibial pulses are intact.  The left radial, dorsalis pedis and posterior tibial pulses are intact. Jugular venous pressure flat, no edema bilaterally    Abdomen:  no organomegaly, masses, bruits, or tenderness; bowel sounds are present   Extremities: no cyanosis or clubbing   Skin: no xanthelasma, warm.    Neurologic: normal  bilateral, no tremors     Psychiatric: alert and oriented x3, calm     General: WNL  Eyes: WNL  Ears/Nose/Throat: WNL  Lungs: WNL  Heart: WNL  Stomach: WNL  Bladder: WNL  Muscle/Joints: WNL  Skin: WNL  Nervous System: WNL  Mental Health: WNL     Blood: WNL     Medical History  Surgical History Family History Social History   Past Medical History:   Diagnosis Date     Acute bronchitis      Root esophagus      BPPV (benign paroxysmal positional vertigo)      Carpal tunnel syndrome      Congenital hip dysplasia      Dysphasia      GERD (gastroesophageal reflux disease)      [place] measles      History of transfusion     hip surgery years ago     Hypothyroidism      Murmur      PVC (premature ventricular contraction)      Shingles     Past Surgical History:   Procedure Laterality Date     CHOLECYSTECTOMY       ENDOMETRIAL BIOPSY  1995    for post menopausal bleeding     FOOT SURGERY Right      HIP SURGERY Right     X4     [place] ESOPHAGOGASTRODUODENOSCOPY TRANSORAL DIAGNOSTIC N/A 3/6/2018    Procedure: ESOPHAGOGASTRODUODENOSCOPY WITH DILATATION;  Surgeon: Eleno Moyer MD;  Location: [facility];  Service: Gastroenterology     MA ESOPHAGOGASTRODUODENOSCOPY TRANSORAL  DIAGNOSTIC N/A 4/3/2019    Procedure: UPPER ENDOSCOPY WITH DILATION;  Surgeon: Eleno Moyer MD;  Location: [facility];  Service: Gastroenterology    Family History   Problem Relation Age of Onset     Heart attack Sister     Social History     Socioeconomic History     Marital status:      Spouse name: Not on file     Number of children: Not on file     Years of education: Not on file     Highest education level: Not on file   Occupational History     Not on file   Social Needs     Financial resource strain: Not on file     Food insecurity     Worry: Not on file     Inability: Not on file     Transportation needs     Medical: Not on file     Non-medical: Not on file   Tobacco Use     Smoking status: Never Smoker     Smokeless tobacco: Never Used   Substance and Sexual Activity     Alcohol use: Yes     Comment: very rare     Drug use: No     Sexual activity: Not on file   Lifestyle     Physical activity     Days per week: Not on file     Minutes per session: Not on file     Stress: Not on file   Relationships     Social connections     Talks on phone: Not on file     Gets together: Not on file     Attends Rest[employer] service: Not on file     Active member of club or organization: Not on file     Attends meetings of clubs or organizations: Not on file     Relationship status: Not on file     Intimate partner violence     Fear of current or ex partner: Not on file     Emotionally abused: Not on file     Physically abused: Not on file     Forced sexual activity: Not on file   Other Topics Concern     Not on file   Social History Narrative     Not on file          Medications  Allergies   Current Outpatient Medications   Medication Sig Dispense Refill     b complex vitamins tablet        cod liver oil cap Take 1 capsule by mouth daily.       diazePAM (VALIUM) 5 MG tablet Take 1-2 tablets (5-10 mg total) by mouth once in imaging for anxiety. Fill at preferred pharmacy and bring to MRI appointment. Do not take  prior to arriving. You will need a  to bring you home after your appointment. 2 tablet 0     fluocinonide (LIDEX) 0.05 % cream Apply 1 application topically as needed.        levothyroxine (SYNTHROID, LEVOTHROID) 25 MCG tablet Take 25 mcg by mouth daily.       loratadine (CLARITIN) 10 mg tablet Take 10 mg by mouth daily as needed.        multivitamin with minerals tablet Take 1 tablet by mouth daily.       omega 3-dha-epa-fish oil 1,000 mg (120 mg-180 mg) cap        OMEGA-3/DHA/EPA/FISH OIL (FISH OIL-OMEGA-3 FATTY ACIDS) 300-1,000 mg capsule Take 2 g by mouth daily.       omeprazole (PRILOSEC) 20 MG capsule Take 20 mg by mouth daily.       LUTEIN ORAL as needed.        meclizine (ANTIVERT) 25 mg tablet Take 1 tablet (25 mg total) by mouth 3 (three) times a day as needed. 30 tablet 0     multivitamin with minerals (THERA-M) 9 mg iron-400 mcg Tab tablet Take 1 tablet by mouth daily.       multivitamin with minerals tablet        psyllium with dextrose (METAMUCIL JAR) powder Take by mouth daily.       ranitidine (ZANTAC) 150 MG capsule Take 150 mg by mouth 2 (two) times a day.       No current facility-administered medications for this visit.     No Known Allergies      Lab Results    Chemistry/lipid CBC Cardiac Enzymes/BNP/TSH/INR   Lab Results   Component Value Date    CHOL 248 (H) 07/23/2018    HDL 49 (L) 07/23/2018    LDLCALC 169 (H) 07/23/2018    TRIG 152 (H) 07/23/2018    CREATININE 0.94 11/19/2019    BUN 18 11/19/2019    K 4.3 11/19/2019     11/19/2019     11/19/2019    CO2 26 11/19/2019    Lab Results   Component Value Date    WBC 8.7 11/25/2010    HGB 11.0 (L) 11/25/2010    HCT 31.7 (L) 11/25/2010    MCV 96 11/25/2010     11/25/2010     11/25/2010    Lab Results   Component Value Date    TROPONINI 0.02 11/23/2010    TSH 3.80 11/19/2019    INR 1.02 11/23/2010

## 2021-07-21 ENCOUNTER — RECORDS - HEALTHEAST (OUTPATIENT)
Dept: ADMINISTRATIVE | Facility: CLINIC | Age: 86
End: 2021-07-21

## 2021-09-15 ENCOUNTER — LAB REQUISITION (OUTPATIENT)
Dept: LAB | Facility: CLINIC | Age: 86
End: 2021-09-15

## 2021-09-15 DIAGNOSIS — E03.9 HYPOTHYROIDISM, UNSPECIFIED: ICD-10-CM

## 2021-09-15 DIAGNOSIS — E78.5 HYPERLIPIDEMIA, UNSPECIFIED: ICD-10-CM

## 2021-09-15 LAB
ALBUMIN SERPL-MCNC: 4 G/DL (ref 3.5–5)
ALP SERPL-CCNC: 136 U/L (ref 45–120)
ALT SERPL W P-5'-P-CCNC: 21 U/L (ref 0–45)
ANION GAP SERPL CALCULATED.3IONS-SCNC: 8 MMOL/L (ref 5–18)
AST SERPL W P-5'-P-CCNC: 22 U/L (ref 0–40)
BILIRUB SERPL-MCNC: 0.7 MG/DL (ref 0–1)
BUN SERPL-MCNC: 17 MG/DL (ref 8–28)
CALCIUM SERPL-MCNC: 10.3 MG/DL (ref 8.5–10.5)
CHLORIDE BLD-SCNC: 104 MMOL/L (ref 98–107)
CHOLEST SERPL-MCNC: 154 MG/DL
CO2 SERPL-SCNC: 29 MMOL/L (ref 22–31)
CREAT SERPL-MCNC: 0.95 MG/DL (ref 0.6–1.1)
GFR SERPL CREATININE-BSD FRML MDRD: 55 ML/MIN/1.73M2
GLUCOSE BLD-MCNC: 126 MG/DL (ref 70–125)
HDLC SERPL-MCNC: 41 MG/DL
LDLC SERPL CALC-MCNC: 76 MG/DL
POTASSIUM BLD-SCNC: 4.2 MMOL/L (ref 3.5–5)
PROT SERPL-MCNC: 6.7 G/DL (ref 6–8)
SODIUM SERPL-SCNC: 141 MMOL/L (ref 136–145)
TRIGL SERPL-MCNC: 185 MG/DL
TSH SERPL DL<=0.005 MIU/L-ACNC: 2.32 UIU/ML (ref 0.3–5)

## 2021-09-15 PROCEDURE — 80061 LIPID PANEL: CPT | Performed by: FAMILY MEDICINE

## 2021-09-15 PROCEDURE — 82040 ASSAY OF SERUM ALBUMIN: CPT | Performed by: FAMILY MEDICINE

## 2021-09-15 PROCEDURE — 36415 COLL VENOUS BLD VENIPUNCTURE: CPT | Performed by: FAMILY MEDICINE

## 2021-09-15 PROCEDURE — 84443 ASSAY THYROID STIM HORMONE: CPT | Performed by: FAMILY MEDICINE

## 2022-01-19 ENCOUNTER — LAB REQUISITION (OUTPATIENT)
Dept: LAB | Facility: CLINIC | Age: 87
End: 2022-01-19
Payer: COMMERCIAL

## 2022-01-19 DIAGNOSIS — R05.9 COUGH, UNSPECIFIED: ICD-10-CM

## 2022-01-19 DIAGNOSIS — Z03.818 ENCOUNTER FOR OBSERVATION FOR SUSPECTED EXPOSURE TO OTHER BIOLOGICAL AGENTS RULED OUT: ICD-10-CM

## 2022-01-19 PROCEDURE — U0005 INFEC AGEN DETEC AMPLI PROBE: HCPCS | Mod: ORL | Performed by: FAMILY MEDICINE

## 2022-01-19 PROCEDURE — 87081 CULTURE SCREEN ONLY: CPT | Mod: ORL | Performed by: FAMILY MEDICINE

## 2022-01-20 LAB — SARS-COV-2 RNA RESP QL NAA+PROBE: NEGATIVE

## 2022-01-22 LAB — BACTERIA SPEC CULT: NORMAL

## 2022-04-10 DIAGNOSIS — E78.2 MIXED HYPERLIPIDEMIA: ICD-10-CM

## 2022-04-11 DIAGNOSIS — I49.3 PVC'S (PREMATURE VENTRICULAR CONTRACTIONS): ICD-10-CM

## 2022-04-11 DIAGNOSIS — R00.1 BRADYCARDIA: ICD-10-CM

## 2022-04-11 DIAGNOSIS — E78.2 MIXED HYPERLIPIDEMIA: Primary | ICD-10-CM

## 2022-04-11 RX ORDER — ATORVASTATIN CALCIUM 20 MG/1
TABLET, FILM COATED ORAL
Qty: 90 TABLET | Refills: 0 | Status: SHIPPED | OUTPATIENT
Start: 2022-04-11 | End: 2022-07-06

## 2022-07-20 ENCOUNTER — OFFICE VISIT (OUTPATIENT)
Dept: CARDIOLOGY | Facility: CLINIC | Age: 87
End: 2022-07-20
Payer: COMMERCIAL

## 2022-07-20 VITALS
HEART RATE: 71 BPM | RESPIRATION RATE: 12 BRPM | BODY MASS INDEX: 23.96 KG/M2 | DIASTOLIC BLOOD PRESSURE: 70 MMHG | WEIGHT: 126.8 LBS | SYSTOLIC BLOOD PRESSURE: 110 MMHG

## 2022-07-20 DIAGNOSIS — I49.3 PVC'S (PREMATURE VENTRICULAR CONTRACTIONS): Primary | ICD-10-CM

## 2022-07-20 DIAGNOSIS — I25.10 CORONARY ARTERY CALCIFICATION: ICD-10-CM

## 2022-07-20 LAB
CHOLEST SERPL-MCNC: 152 MG/DL
FASTING STATUS PATIENT QL REPORTED: NO
HDLC SERPL-MCNC: 46 MG/DL
LDLC SERPL CALC-MCNC: 69 MG/DL
TRIGL SERPL-MCNC: 185 MG/DL

## 2022-07-20 PROCEDURE — 36415 COLL VENOUS BLD VENIPUNCTURE: CPT | Performed by: INTERNAL MEDICINE

## 2022-07-20 PROCEDURE — 80061 LIPID PANEL: CPT | Performed by: INTERNAL MEDICINE

## 2022-07-20 PROCEDURE — 99215 OFFICE O/P EST HI 40 MIN: CPT | Performed by: INTERNAL MEDICINE

## 2022-07-20 NOTE — PROGRESS NOTES
HEART CARE ENCOUNTER CONSULTATON NOTE      [facility]  [phone]      Assessment/Recommendations   Assessment/Plan:  PVCs - asymptomatic. We discussed symptoms to watch for.    Coronary artery calcification - on statin, LDL check today. Mediterranean diet and regular exercise encouraged.    Hyperlipidemia - as above, on statin    F/U 12 months       History of Present Illness/Subjective    HPI: Tayla Benz is a 86 year old female active female with hypothyroidism on synthroid and untreated dyslipidemia who was found to have PVC driven bradycardia on a pre-operative EKG for cataract surgery. EKG from Dr. Guadarrama shows NSR with frequent monomorphic PVCs. She did have an echo a couple of years ago for an incidental murmur but nothing really showed up. Cardial MRI showed EF 55% and no scar/inflammation. Last LDL was 169. Coronary calcium score was 100 and statin was started 2021.    Tayla returns for annual follow up. She does not feel her palpitations and denies any dyspnea, chest pain, pnd/orthopnea or edema. She does have occasional vertigo but no syncope.         Physical Examination  Review of Systems   Vitals: /70 (BP Location: Left arm, Patient Position: Sitting, Cuff Size: Adult Regular)   Pulse 71   Resp 12   Wt 57.5 kg (126 lb 12.8 oz)   BMI 23.96 kg/m    BMI= Body mass index is 23.96 kg/m .  Wt Readings from Last 3 Encounters:   07/20/22 57.5 kg (126 lb 12.8 oz)   11/04/20 59 kg (130 lb)   12/04/19 59 kg (130 lb)       General Appearance:   no distress, normal body habitus   ENT/Mouth: membranes moist, no oral lesions or bleeding gums.      EYES:  no scleral icterus, normal conjunctivae   Neck: no carotid bruits or thyromegaly   Chest/Lungs:   lungs are clear to auscultation, no rales or wheezing, no sternal scar, equal chest wall expansion    Cardiovascular:   Regular. Normal first and second heart sounds with early systolic murmur. No rubs or gallops; the right  carotid, radial and posterior tibial pulses are intact and the left carotid, radial and posterior tibial pulses are intact.  Jugular venous pressure is flat, no edema bilaterally    Abdomen:  no organomegaly, masses, bruits, or tenderness; bowel sounds are present   Extremities: no cyanosis or clubbing   Skin: no xanthelasma, warm.    Neurologic: normal  bilateral, no tremors     Psychiatric: alert and oriented x3, calm        Please refer above for cardiac ROS details.        Medical History  Surgical History Family History Social History   Past Medical History:   Diagnosis Date     Acute bronchitis      Root esophagus      BPPV (benign paroxysmal positional vertigo)      Carpal tunnel syndrome      Congenital hip dysplasia      Dysphasia      GERD (gastroesophageal reflux disease)      [place] measles      History of transfusion     hip surgery years ago     Hypothyroidism      Murmur      PVC (premature ventricular contraction)      Shingles      Past Surgical History:   Procedure Laterality Date     CHOLECYSTECTOMY       ENDOMETRIAL BIOPSY  1995    for post menopausal bleeding     FOOT SURGERY Right      HIP SURGERY Right     X4     [place] ESOPHAGOGASTRODUODENOSCOPY TRANSORAL DIAGNOSTIC N/A 3/6/2018    Procedure: ESOPHAGOGASTRODUODENOSCOPY WITH DILATATION;  Surgeon: Eleno Moyer MD;  Location: [facility];  Service: Gastroenterology     [place] ESOPHAGOGASTRODUODENOSCOPY TRANSORAL DIAGNOSTIC N/A 4/3/2019    Procedure: UPPER ENDOSCOPY WITH DILATION;  Surgeon: Eleno Moyer MD;  Location: [facility];  Service: Gastroenterology     Family History   Problem Relation Age of Onset     Coronary Artery Disease Sister         Social History     Socioeconomic History     Marital status:      Spouse name: Not on file     Number of children: Not on file     Years of education: Not on file     Highest education level: Not on file   Occupational History     Not on file   Tobacco Use     Smoking status:  Never Smoker     Smokeless tobacco: Never Used   Substance and Sexual Activity     Alcohol use: Yes     Comment: Alcoholic Drinks/day: very rare     Drug use: No     Sexual activity: Not on file   Other Topics Concern     Not on file   Social History Narrative     Not on file     Social Determinants of Health     Financial Resource Strain: Not on file   Food Insecurity: Not on file   Transportation Needs: Not on file   Physical Activity: Not on file   Stress: Not on file   Social Connections: Not on file   Intimate Partner Violence: Not on file   Housing Stability: Not on file           Medications  Allergies   Current Outpatient Medications   Medication Sig Dispense Refill     atorvastatin (LIPITOR) 20 MG tablet TAKE 1 TABLET BY MOUTH AT BEDTIME 90 tablet 0     b complex vitamins tablet [B COMPLEX VITAMINS TABLET]        coenzyme Q10 (CO Q-10) 100 mg capsule [COENZYME Q10 (CO Q-10) 100 MG CAPSULE] Take 1 capsule (100 mg total) by mouth 2 (two) times a day.  0     diazePAM (VALIUM) 5 MG tablet [DIAZEPAM (VALIUM) 5 MG TABLET] Take 1-2 tablets (5-10 mg total) by mouth once in imaging for anxiety. Fill at preferred pharmacy and bring to MRI appointment. Do not take prior to arriving. You will need a  to bring you home after your appointment. 2 tablet 0     fluocinonide (LIDEX) 0.05 % cream [FLUOCINONIDE (LIDEX) 0.05 % CREAM] Apply 1 application topically as needed.        levothyroxine (SYNTHROID, LEVOTHROID) 25 MCG tablet [LEVOTHYROXINE (SYNTHROID, LEVOTHROID) 25 MCG TABLET] Take 25 mcg by mouth daily.       loratadine (CLARITIN) 10 mg tablet [LORATADINE (CLARITIN) 10 MG TABLET] Take 10 mg by mouth daily as needed.        meclizine (ANTIVERT) 25 mg tablet [MECLIZINE (ANTIVERT) 25 MG TABLET] Take 1 tablet (25 mg total) by mouth 3 (three) times a day as needed. 30 tablet 0     multivitamin with minerals tablet [MULTIVITAMIN WITH MINERALS TABLET] Take 1 tablet by mouth daily.       omega 3-dha-epa-fish oil 1,000 mg  (120 mg-180 mg) cap [OMEGA 3-DHA-EPA-FISH OIL 1,000 MG (120 MG-180 MG) CAP]        omeprazole (PRILOSEC) 20 MG capsule [OMEPRAZOLE (PRILOSEC) 20 MG CAPSULE] Take 20 mg by mouth daily.       aspirin 81 MG EC tablet [ASPIRIN 81 MG EC TABLET] Take 1 tablet (81 mg total) by mouth daily. (Patient not taking: Reported on 7/20/2022)  0     cod liver oil cap [COD LIVER OIL CAP] Take 1 capsule by mouth daily. (Patient not taking: Reported on 7/20/2022)       LUTEIN ORAL [LUTEIN ORAL] as needed.  (Patient not taking: Reported on 7/20/2022)       OMEGA-3/DHA/EPA/FISH OIL (FISH OIL-OMEGA-3 FATTY ACIDS) 300-1,000 mg capsule [OMEGA-3/DHA/EPA/FISH OIL (FISH OIL-OMEGA-3 FATTY ACIDS) 300-1,000 MG CAPSULE] Take 2 g by mouth daily. (Patient not taking: Reported on 7/20/2022)       psyllium with dextrose (METAMUCIL JAR) powder [PSYLLIUM WITH DEXTROSE (METAMUCIL JAR) POWDER] Take by mouth daily. (Patient not taking: Reported on 7/20/2022)       ranitidine (ZANTAC) 150 MG capsule [RANITIDINE (ZANTAC) 150 MG CAPSULE] Take 150 mg by mouth 2 (two) times a day. (Patient not taking: Reported on 7/20/2022)       No Known Allergies       Lab Results    Chemistry/lipid CBC Cardiac Enzymes/BNP/TSH/INR   Recent Labs   Lab Test 09/15/21  0813   CHOL 154   HDL 41*   LDL 76   TRIG 185*     Recent Labs   Lab Test 09/15/21  0813 11/30/20  0910 11/04/20  1053   LDL 76 60 156*     Recent Labs   Lab Test 09/15/21  0813      POTASSIUM 4.2   CHLORIDE 104   CO2 29   *   BUN 17   CR 0.95   GFRESTIMATED 55*   BJ 10.3     Recent Labs   Lab Test 09/15/21  0813 11/30/20  0910 01/09/20  1245   CR 0.95 0.80 0.9     No results for input(s): A1C in the last 95457 hours.       No results for input(s): WBC, HGB, HCT, MCV, PLT in the last 99710 hours.  No results for input(s): HGB in the last 26360 hours. No results for input(s): TROPONINI in the last 71398 hours.  No results for input(s): BNP, NTBNPI, NTBNP in the last 36861 hours.  Recent Labs   Lab Test  09/15/21  0813   TSH 2.32     No results for input(s): INR in the last 73693 hours.     Today's clinic visit entailed:  Review of prior external note(s) from - [facility] information from epic reviewed  40 minutes spent on the date of the encounter doing chart review, review of outside records, review of test results, interpretation of tests, patient visit and documentation   Provider  Link to [facility] Help Grid     The level of medical decision making during this visit was of moderate complexity.        Sarah Banks MD

## 2022-07-20 NOTE — PATIENT INSTRUCTIONS
It was a pleasure seeing you at [facility] today.        Here are my suggestions for your care:    1. Check cholesterol today    2. Exercise 30 minutes at least 5 days per week    3. Stick to a heart healthy mediterranean diet      Let's meet again in 12 months.    You can always call my nurse Aurora Alan RN who is a nurse helping me in the care of my patients. She can be reached at [phone] if you have any questions.    For scheduling, please call my  Livier Hampton at [phone].    Thank you again for trusting me with your care. Please feel free to call my office at any time if you have any question or if I can assist you in any way.    Sarah Banks MD  [facility]

## 2022-07-20 NOTE — LETTER
7/20/2022    Osmin Guadarrama MD  [facility] [address] [place] [address]    RE: Tayla Benz       Dear Colleague,     I had the pleasure of seeing Tayla Benz in the [facility].    HEART CARE ENCOUNTER CONSULTATON NOTE      [facility]  [phone]      Assessment/Recommendations   Assessment/Plan:  PVCs - asymptomatic. We discussed symptoms to watch for.    Coronary artery calcification - on statin, LDL check today. Mediterranean diet and regular exercise encouraged.    Hyperlipidemia - as above, on statin    F/U 12 months       History of Present Illness/Subjective    HPI: Tayla Benz is a 86 year old female active female with hypothyroidism on synthroid and untreated dyslipidemia who was found to have PVC driven bradycardia on a pre-operative EKG for cataract surgery. EKG from Dr. Guadarrama shows NSR with frequent monomorphic PVCs. She did have an echo a couple of years ago for an incidental murmur but nothing really showed up. Cardial MRI showed EF 55% and no scar/inflammation. Last LDL was 169. Coronary calcium score was 100 and statin was started 2021.    Tayla returns for annual follow up. She does not feel her palpitations and denies any dyspnea, chest pain, pnd/orthopnea or edema. She does have occasional vertigo but no syncope.         Physical Examination  Review of Systems   Vitals: /70 (BP Location: Left arm, Patient Position: Sitting, Cuff Size: Adult Regular)   Pulse 71   Resp 12   Wt 57.5 kg (126 lb 12.8 oz)   BMI 23.96 kg/m    BMI= Body mass index is 23.96 kg/m .  Wt Readings from Last 3 Encounters:   07/20/22 57.5 kg (126 lb 12.8 oz)   11/04/20 59 kg (130 lb)   12/04/19 59 kg (130 lb)       General Appearance:   no distress, normal body habitus   ENT/Mouth: membranes moist, no oral lesions or bleeding gums.      EYES:  no scleral icterus, normal conjunctivae   Neck: no carotid bruits or thyromegaly    Chest/Lungs:   lungs are clear to auscultation, no rales or wheezing, no sternal scar, equal chest wall expansion    Cardiovascular:   Regular. Normal first and second heart sounds with early systolic murmur. No rubs or gallops; the right carotid, radial and posterior tibial pulses are intact and the left carotid, radial and posterior tibial pulses are intact.  Jugular venous pressure is flat, no edema bilaterally    Abdomen:  no organomegaly, masses, bruits, or tenderness; bowel sounds are present   Extremities: no cyanosis or clubbing   Skin: no xanthelasma, warm.    Neurologic: normal  bilateral, no tremors     Psychiatric: alert and oriented x3, calm        Please refer above for cardiac ROS details.        Medical History  Surgical History Family History Social History   Past Medical History:   Diagnosis Date     Acute bronchitis      Root esophagus      BPPV (benign paroxysmal positional vertigo)      Carpal tunnel syndrome      Congenital hip dysplasia      Dysphasia      GERD (gastroesophageal reflux disease)      [place]enian measles      History of transfusion     hip surgery years ago     Hypothyroidism      Murmur      PVC (premature ventricular contraction)      Shingles      Past Surgical History:   Procedure Laterality Date     CHOLECYSTECTOMY       ENDOMETRIAL BIOPSY  1995    for post menopausal bleeding     FOOT SURGERY Right      HIP SURGERY Right     X4     [place] ESOPHAGOGASTRODUODENOSCOPY TRANSORAL DIAGNOSTIC N/A 3/6/2018    Procedure: ESOPHAGOGASTRODUODENOSCOPY WITH DILATATION;  Surgeon: Eleno Moyer MD;  Location: [facility] OR;  Service: Gastroenterology     [place] ESOPHAGOGASTRODUODENOSCOPY TRANSORAL DIAGNOSTIC N/A 4/3/2019    Procedure: UPPER ENDOSCOPY WITH DILATION;  Surgeon: Eleno Moyer MD;  Location: [facility] GI;  Service: Gastroenterology     Family History   Problem Relation Age of Onset     Coronary Artery Disease Sister         Social History     Socioeconomic History      Marital status:      Spouse name: Not on file     Number of children: Not on file     Years of education: Not on file     Highest education level: Not on file   Occupational History     Not on file   Tobacco Use     Smoking status: Never Smoker     Smokeless tobacco: Never Used   Substance and Sexual Activity     Alcohol use: Yes     Comment: Alcoholic Drinks/day: very rare     Drug use: No     Sexual activity: Not on file   Other Topics Concern     Not on file   Social History Narrative     Not on file     Social Determinants of Health     Financial Resource Strain: Not on file   Food Insecurity: Not on file   Transportation Needs: Not on file   Physical Activity: Not on file   Stress: Not on file   Social Connections: Not on file   Intimate Partner Violence: Not on file   Housing Stability: Not on file           Medications  Allergies   Current Outpatient Medications   Medication Sig Dispense Refill     atorvastatin (LIPITOR) 20 MG tablet TAKE 1 TABLET BY MOUTH AT BEDTIME 90 tablet 0     b complex vitamins tablet [B COMPLEX VITAMINS TABLET]        coenzyme Q10 (CO Q-10) 100 mg capsule [COENZYME Q10 (CO Q-10) 100 MG CAPSULE] Take 1 capsule (100 mg total) by mouth 2 (two) times a day.  0     diazePAM (VALIUM) 5 MG tablet [DIAZEPAM (VALIUM) 5 MG TABLET] Take 1-2 tablets (5-10 mg total) by mouth once in imaging for anxiety. Fill at preferred pharmacy and bring to MRI appointment. Do not take prior to arriving. You will need a  to bring you home after your appointment. 2 tablet 0     fluocinonide (LIDEX) 0.05 % cream [FLUOCINONIDE (LIDEX) 0.05 % CREAM] Apply 1 application topically as needed.        levothyroxine (SYNTHROID, LEVOTHROID) 25 MCG tablet [LEVOTHYROXINE (SYNTHROID, LEVOTHROID) 25 MCG TABLET] Take 25 mcg by mouth daily.       loratadine (CLARITIN) 10 mg tablet [LORATADINE (CLARITIN) 10 MG TABLET] Take 10 mg by mouth daily as needed.        meclizine (ANTIVERT) 25 mg tablet [MECLIZINE (ANTIVERT)  25 MG TABLET] Take 1 tablet (25 mg total) by mouth 3 (three) times a day as needed. 30 tablet 0     multivitamin with minerals tablet [MULTIVITAMIN WITH MINERALS TABLET] Take 1 tablet by mouth daily.       omega 3-dha-epa-fish oil 1,000 mg (120 mg-180 mg) cap [OMEGA 3-DHA-EPA-FISH OIL 1,000 MG (120 MG-180 MG) CAP]        omeprazole (PRILOSEC) 20 MG capsule [OMEPRAZOLE (PRILOSEC) 20 MG CAPSULE] Take 20 mg by mouth daily.       aspirin 81 MG EC tablet [ASPIRIN 81 MG EC TABLET] Take 1 tablet (81 mg total) by mouth daily. (Patient not taking: Reported on 7/20/2022)  0     cod liver oil cap [COD LIVER OIL CAP] Take 1 capsule by mouth daily. (Patient not taking: Reported on 7/20/2022)       LUTEIN ORAL [LUTEIN ORAL] as needed.  (Patient not taking: Reported on 7/20/2022)       OMEGA-3/DHA/EPA/FISH OIL (FISH OIL-OMEGA-3 FATTY ACIDS) 300-1,000 mg capsule [OMEGA-3/DHA/EPA/FISH OIL (FISH OIL-OMEGA-3 FATTY ACIDS) 300-1,000 MG CAPSULE] Take 2 g by mouth daily. (Patient not taking: Reported on 7/20/2022)       psyllium with dextrose (METAMUCIL JAR) powder [PSYLLIUM WITH DEXTROSE (METAMUCIL JAR) POWDER] Take by mouth daily. (Patient not taking: Reported on 7/20/2022)       ranitidine (ZANTAC) 150 MG capsule [RANITIDINE (ZANTAC) 150 MG CAPSULE] Take 150 mg by mouth 2 (two) times a day. (Patient not taking: Reported on 7/20/2022)       No Known Allergies       Lab Results    Chemistry/lipid CBC Cardiac Enzymes/BNP/TSH/INR   Recent Labs   Lab Test 09/15/21  0813   CHOL 154   HDL 41*   LDL 76   TRIG 185*     Recent Labs   Lab Test 09/15/21  0813 11/30/20  0910 11/04/20  1053   LDL 76 60 156*     Recent Labs   Lab Test 09/15/21  0813      POTASSIUM 4.2   CHLORIDE 104   CO2 29   *   BUN 17   CR 0.95   GFRESTIMATED 55*   BJ 10.3     Recent Labs   Lab Test 09/15/21  0813 11/30/20  0910 01/09/20  1245   CR 0.95 0.80 0.9     No results for input(s): A1C in the last 69429 hours.       No results for input(s): WBC, HGB, HCT,  MCV, PLT in the last 24816 hours.  No results for input(s): HGB in the last 55320 hours. No results for input(s): TROPONINI in the last 12387 hours.  No results for input(s): BNP, NTBNPI, NTBNP in the last 46332 hours.  Recent Labs   Lab Test 09/15/21  0813   TSH 2.32     No results for input(s): INR in the last 23222 hours.     Today's clinic visit entailed:  Review of prior external note(s) from - [facility] information from epic reviewed  40 minutes spent on the date of the encounter doing chart review, review of outside records, review of test results, interpretation of tests, patient visit and documentation   Provider  Link to [facility] Help Grid     The level of medical decision making during this visit was of moderate complexity.        Sarah Banks MD            Thank you for allowing me to participate in the care of your patient.      Sincerely,     Sarah Banks MD     [facility]  cc:   Referred Self,

## 2022-09-08 ENCOUNTER — HOSPITAL ENCOUNTER (EMERGENCY)
Facility: HOSPITAL | Age: 87
Discharge: HOME | End: 2022-09-08
Attending: EMERGENCY MEDICINE | Admitting: EMERGENCY MEDICINE
Payer: COMMERCIAL

## 2022-09-08 ENCOUNTER — APPOINTMENT (OUTPATIENT)
Dept: CT IMAGING | Facility: HOSPITAL | Age: 87
End: 2022-09-08
Attending: EMERGENCY MEDICINE
Payer: COMMERCIAL

## 2022-09-08 VITALS
TEMPERATURE: 98 F | BODY MASS INDEX: 23.62 KG/M2 | WEIGHT: 125 LBS | OXYGEN SATURATION: 96 % | DIASTOLIC BLOOD PRESSURE: 61 MMHG | SYSTOLIC BLOOD PRESSURE: 143 MMHG | RESPIRATION RATE: 16 BRPM | HEART RATE: 69 BPM

## 2022-09-08 DIAGNOSIS — D25.9 UTERINE LEIOMYOMA, UNSPECIFIED LOCATION: ICD-10-CM

## 2022-09-08 DIAGNOSIS — R07.9 CHEST PAIN, UNSPECIFIED TYPE: ICD-10-CM

## 2022-09-08 LAB
ALBUMIN SERPL-MCNC: 4.3 G/DL (ref 3.5–5)
ALP SERPL-CCNC: 126 U/L (ref 45–120)
ALT SERPL W P-5'-P-CCNC: 24 U/L (ref 0–45)
ANION GAP SERPL CALCULATED.3IONS-SCNC: 7 MMOL/L (ref 5–18)
AST SERPL W P-5'-P-CCNC: 27 U/L (ref 0–40)
BASOPHILS # BLD AUTO: 0 10E3/UL (ref 0–0.2)
BASOPHILS NFR BLD AUTO: 0 %
BILIRUB SERPL-MCNC: 0.6 MG/DL (ref 0–1)
BNP SERPL-MCNC: 20 PG/ML (ref 0–167)
BUN SERPL-MCNC: 20 MG/DL (ref 8–28)
CALCIUM SERPL-MCNC: 10 MG/DL (ref 8.5–10.5)
CHLORIDE BLD-SCNC: 100 MMOL/L (ref 98–107)
CO2 SERPL-SCNC: 33 MMOL/L (ref 22–31)
CREAT SERPL-MCNC: 0.81 MG/DL (ref 0.6–1.1)
EOSINOPHIL # BLD AUTO: 0.1 10E3/UL (ref 0–0.7)
EOSINOPHIL NFR BLD AUTO: 1 %
ERYTHROCYTE [DISTWIDTH] IN BLOOD BY AUTOMATED COUNT: 12.4 % (ref 10–15)
GFR SERPL CREATININE-BSD FRML MDRD: 71 ML/MIN/1.73M2
GLUCOSE BLD-MCNC: 89 MG/DL (ref 70–125)
HCT VFR BLD AUTO: 44.2 % (ref 35–47)
HGB BLD-MCNC: 15 G/DL (ref 11.7–15.7)
HOLD SPECIMEN: NORMAL
HOLD SPECIMEN: NORMAL
IMM GRANULOCYTES # BLD: 0 10E3/UL
IMM GRANULOCYTES NFR BLD: 0 %
LIPASE SERPL-CCNC: 21 U/L (ref 0–52)
LYMPHOCYTES # BLD AUTO: 3.3 10E3/UL (ref 0.8–5.3)
LYMPHOCYTES NFR BLD AUTO: 39 %
MCH RBC QN AUTO: 31.2 PG (ref 26.5–33)
MCHC RBC AUTO-ENTMCNC: 33.9 G/DL (ref 31.5–36.5)
MCV RBC AUTO: 92 FL (ref 78–100)
MONOCYTES # BLD AUTO: 0.8 10E3/UL (ref 0–1.3)
MONOCYTES NFR BLD AUTO: 10 %
NEUTROPHILS # BLD AUTO: 4.2 10E3/UL (ref 1.6–8.3)
NEUTROPHILS NFR BLD AUTO: 50 %
NRBC # BLD AUTO: 0 10E3/UL
NRBC BLD AUTO-RTO: 0 /100
PLATELET # BLD AUTO: 175 10E3/UL (ref 150–450)
POTASSIUM BLD-SCNC: 3.7 MMOL/L (ref 3.5–5)
PROT SERPL-MCNC: 7.2 G/DL (ref 6–8)
RBC # BLD AUTO: 4.81 10E6/UL (ref 3.8–5.2)
SODIUM SERPL-SCNC: 140 MMOL/L (ref 136–145)
TROPONIN I SERPL-MCNC: 0.02 NG/ML (ref 0–0.29)
WBC # BLD AUTO: 8.5 10E3/UL (ref 4–11)

## 2022-09-08 PROCEDURE — 83690 ASSAY OF LIPASE: CPT | Performed by: EMERGENCY MEDICINE

## 2022-09-08 PROCEDURE — 80053 COMPREHEN METABOLIC PANEL: CPT | Performed by: EMERGENCY MEDICINE

## 2022-09-08 PROCEDURE — 36415 COLL VENOUS BLD VENIPUNCTURE: CPT | Performed by: EMERGENCY MEDICINE

## 2022-09-08 PROCEDURE — 99285 EMERGENCY DEPT VISIT HI MDM: CPT | Mod: 25

## 2022-09-08 PROCEDURE — 85004 AUTOMATED DIFF WBC COUNT: CPT | Performed by: EMERGENCY MEDICINE

## 2022-09-08 PROCEDURE — 74174 CTA ABD&PLVS W/CONTRAST: CPT

## 2022-09-08 PROCEDURE — 83880 ASSAY OF NATRIURETIC PEPTIDE: CPT | Performed by: EMERGENCY MEDICINE

## 2022-09-08 PROCEDURE — 71275 CT ANGIOGRAPHY CHEST: CPT

## 2022-09-08 PROCEDURE — 84484 ASSAY OF TROPONIN QUANT: CPT | Performed by: EMERGENCY MEDICINE

## 2022-09-08 PROCEDURE — 250N000013 HC RX MED GY IP 250 OP 250 PS 637: Performed by: EMERGENCY MEDICINE

## 2022-09-08 PROCEDURE — 93005 ELECTROCARDIOGRAM TRACING: CPT | Performed by: EMERGENCY MEDICINE

## 2022-09-08 PROCEDURE — 82040 ASSAY OF SERUM ALBUMIN: CPT | Performed by: EMERGENCY MEDICINE

## 2022-09-08 PROCEDURE — 250N000011 HC RX IP 250 OP 636: Performed by: EMERGENCY MEDICINE

## 2022-09-08 PROCEDURE — 250N000009 HC RX 250: Performed by: EMERGENCY MEDICINE

## 2022-09-08 RX ORDER — ASPIRIN 81 MG/1
81 TABLET, CHEWABLE ORAL ONCE
Status: COMPLETED | OUTPATIENT
Start: 2022-09-08 | End: 2022-09-08

## 2022-09-08 RX ORDER — FAMOTIDINE 10 MG
10 TABLET ORAL 2 TIMES DAILY
Qty: 20 TABLET | Refills: 0 | Status: SHIPPED | OUTPATIENT
Start: 2022-09-08

## 2022-09-08 RX ORDER — IOPAMIDOL 755 MG/ML
100 INJECTION, SOLUTION INTRAVASCULAR ONCE
Status: COMPLETED | OUTPATIENT
Start: 2022-09-08 | End: 2022-09-08

## 2022-09-08 RX ADMIN — LIDOCAINE HYDROCHLORIDE 30 ML: 20 SOLUTION ORAL; TOPICAL at 14:34

## 2022-09-08 RX ADMIN — IOPAMIDOL 100 ML: 755 INJECTION, SOLUTION INTRAVENOUS at 14:53

## 2022-09-08 RX ADMIN — ASPIRIN 81 MG: 81 TABLET, CHEWABLE ORAL at 12:53

## 2022-09-08 ASSESSMENT — ACTIVITIES OF DAILY LIVING (ADL): ADLS_ACUITY_SCORE: 35

## 2022-09-08 NOTE — ED PROVIDER NOTES
EMERGENCY DEPARTMENT ENCOUNTER      NAME: Tayla Benz  AGE: 86 year old female  YOB: 1935  MRN: [id number]  EVALUATION DATE & TIME: No admission date for patient encounter.    PCP: Osmin Guadarrama    ED PROVIDER: Preeti Victoria MD    Chief Complaint   Patient presents with     Chest Pain         FINAL IMPRESSION:  1. Chest pain, unspecified type          ED COURSE & MEDICAL DECISION MAKING:    Pertinent Labs & Imaging studies reviewed. (See chart for details)  86 year old female with history of HLD, PVC and bradycardia, and frequent GERD/Root's esophagus who presents to the Emergency Department for evaluation of increase of her GERD type symptoms describing burning pain in her chest, frequent burping burping/belching.  This does however radiate through to the back and is not responding to her typical home antacids of omeprazole, Tums though she did get some relief with baking soda.  Symptoms do seem most consistent with GERD.  She does not have any exertional symptomatology or associated lightheadedness, dizziness, dyspnea.  Overall my suspicion for dissection is low but with the radiation to her back will obtain CT imaging to evaluate for same.  She does not have any significant abdominal pain or history of pancreatic pathology and is status post cholecystectomy.    Patient placed on monitor, IV established and blood obtained.  Twelve-lead EKG shows sinus rhythm without ischemic changes.  CBC, CMP, lipase, BNP, troponin unremarkable.  Patient had been given dose of aspirin in triage.  GI cocktail given.  CTA chest abdomen pelvis is pending at time of dictation.  Patient signed out to Dr. Colon awaiting the results of the above.  If imaging unremarkable would not need to repeat troponin as patient has had constant symptoms times greater than 6 hours.  Would recommend adding dual H2/PPI and outpatient GI follow-up given history of Root's esophagus.          1:27 PM I met with the patient  to gather history and to perform my initial exam. We discussed plans for the ED course, including diagnostic testing and treatment. PPE: surgical mask and gloves    At the conclusion of the encounter I discussed the results of all of the tests and the disposition. The questions were answered. The patient or family acknowledged understanding and was agreeable with the care plan.      MEDICATIONS GIVEN IN THE EMERGENCY:  Medications   aspirin (ASA) chewable tablet 81 mg (81 mg Oral Given 9/8/22 1253)   lidocaine (viscous) (XYLOCAINE) 2 % 15 mL, alum & mag hydroxide-simethicone (MAALOX) 15 mL GI Cocktail (30 mLs Oral Given 9/8/22 5784)   iopamidol (ISOVUE-370) solution 100 mL (100 mLs Intravenous Given 9/8/22 7283)       NEW PRESCRIPTIONS STARTED AT TODAY'S ER VISIT  New Prescriptions    No medications on file          =================================================================    HPI    Patient information was obtained from: patient     Use of Intrepreter: N/A        Tayla Benz is a 86 year old female with pertinent medical history of GERD, PVCs, and bradycardia who presents for evaluation of chest pain.     Patient reports chest pain that began last night (9/7/22). Her pain is in the center of her chest and radiates to her back. Walking around helped her pain while laying flat exacerbated it. She takes Omeprazole 20 mg 1x daily but for the last few days has been taking it 2x daily due to heart burn. Last night, she took an extra dose which did not help her symptoms. She then drank some baking soda water which made her burp and helped her pain a little. She was then able to sleep, but since waking up at 7:30 AM today her pain has been constant, although it has decreased in severity. She presently rates it as 2/10. The pain feels similar to her previous heart burn but is not getting better with her typical treatment, which prompted her to come to the ED. Patient denies any exertional pain exacerbation or  associated shortness of breath. She denies any history of pancreatic issues. History of gall bladder removal. She notes that she had hyperlipidemia but that this is well controlled on a statin. Of note, the patient's sister  of a heart attack at 82. No other complaints or concerns expressed at this time.     REVIEW OF SYSTEMS  Constitutional:  Denies fever, chills, weight loss or weakness  Respiratory: No SOB, wheeze or cough  Cardiovascular: Positive for chest pain (center). No palpitations  GI:  Denies abdominal pain, nausea, vomiting, diarrhea  : Denies dysuria, denies hematuria  Musculoskeletal: Positive for back pain. Denies any new muscle/joint pain, swelling or loss of function.  Neurologic:  Denies headache, focal weakness or sensory changes  All other systems negative unless noted in HPI.      PAST MEDICAL HISTORY:  Past Medical History:   Diagnosis Date     Acute bronchitis      Root esophagus      BPPV (benign paroxysmal positional vertigo)      Carpal tunnel syndrome      Congenital hip dysplasia      Dysphasia      GERD (gastroesophageal reflux disease)      Sami measles      History of transfusion     hip surgery years ago     Hypothyroidism      Murmur      PVC (premature ventricular contraction)      Shingles        PAST SURGICAL HISTORY:  Past Surgical History:   Procedure Laterality Date     CHOLECYSTECTOMY       ENDOMETRIAL BIOPSY      for post menopausal bleeding     FOOT SURGERY Right      HIP SURGERY Right     X4     [place] ESOPHAGOGASTRODUODENOSCOPY TRANSORAL DIAGNOSTIC N/A 3/6/2018    Procedure: ESOPHAGOGASTRODUODENOSCOPY WITH DILATATION;  Surgeon: Eleno Moyer MD;  Location: [facility] OR;  Service: Gastroenterology     [place] ESOPHAGOGASTRODUODENOSCOPY TRANSORAL DIAGNOSTIC N/A 4/3/2019    Procedure: UPPER ENDOSCOPY WITH DILATION;  Surgeon: Eleno Moyer MD;  Location: [facility] GI;  Service: Gastroenterology       CURRENT MEDICATIONS:    Prior to Admission Medications    Prescriptions Last Dose Informant Patient Reported? Taking?   LUTEIN ORAL   Yes No   Sig: [LUTEIN ORAL] as needed.    Patient not taking: Reported on 7/20/2022   OMEGA-3/DHA/EPA/FISH OIL (FISH OIL-OMEGA-3 FATTY ACIDS) 300-1,000 mg capsule   Yes No   Sig: [OMEGA-3/DHA/EPA/FISH OIL (FISH OIL-OMEGA-3 FATTY ACIDS) 300-1,000 MG CAPSULE] Take 2 g by mouth daily.   Patient not taking: Reported on 7/20/2022   aspirin 81 MG EC tablet   No No   Sig: [ASPIRIN 81 MG EC TABLET] Take 1 tablet (81 mg total) by mouth daily.   Patient not taking: Reported on 7/20/2022   atorvastatin (LIPITOR) 20 MG tablet   No No   Sig: TAKE 1 TABLET BY MOUTH AT BEDTIME   b complex vitamins tablet   Yes No   Sig: [B COMPLEX VITAMINS TABLET]    cod liver oil cap   Yes No   Sig: [COD LIVER OIL CAP] Take 1 capsule by mouth daily.   Patient not taking: Reported on 7/20/2022   coenzyme Q10 (CO Q-10) 100 mg capsule   No No   Sig: [COENZYME Q10 (CO Q-10) 100 MG CAPSULE] Take 1 capsule (100 mg total) by mouth 2 (two) times a day.   diazePAM (VALIUM) 5 MG tablet   No No   Sig: [DIAZEPAM (VALIUM) 5 MG TABLET] Take 1-2 tablets (5-10 mg total) by mouth once in imaging for anxiety. Fill at preferred pharmacy and bring to MRI appointment. Do not take prior to arriving. You will need a  to bring you home after your appointment.   fluocinonide (LIDEX) 0.05 % cream   Yes No   Sig: [FLUOCINONIDE (LIDEX) 0.05 % CREAM] Apply 1 application topically as needed.    levothyroxine (SYNTHROID, LEVOTHROID) 25 MCG tablet   Yes No   Sig: [LEVOTHYROXINE (SYNTHROID, LEVOTHROID) 25 MCG TABLET] Take 25 mcg by mouth daily.   loratadine (CLARITIN) 10 mg tablet   Yes No   Sig: [LORATADINE (CLARITIN) 10 MG TABLET] Take 10 mg by mouth daily as needed.    meclizine (ANTIVERT) 25 mg tablet   No No   Sig: [MECLIZINE (ANTIVERT) 25 MG TABLET] Take 1 tablet (25 mg total) by mouth 3 (three) times a day as needed.   multivitamin with minerals tablet   Yes No   Sig: [MULTIVITAMIN WITH  MINERALS TABLET] Take 1 tablet by mouth daily.   omega 3-dha-epa-fish oil 1,000 mg (120 mg-180 mg) cap   Yes No   Sig: [OMEGA 3-DHA-EPA-FISH OIL 1,000 MG (120 MG-180 MG) CAP]    omeprazole (PRILOSEC) 20 MG capsule   Yes No   Sig: [OMEPRAZOLE (PRILOSEC) 20 MG CAPSULE] Take 20 mg by mouth daily.   psyllium with dextrose (METAMUCIL JAR) powder   Yes No   Sig: [PSYLLIUM WITH DEXTROSE (METAMUCIL JAR) POWDER] Take by mouth daily.   Patient not taking: Reported on 7/20/2022   ranitidine (ZANTAC) 150 MG capsule   Yes No   Sig: [RANITIDINE (ZANTAC) 150 MG CAPSULE] Take 150 mg by mouth 2 (two) times a day.   Patient not taking: Reported on 7/20/2022      Facility-Administered Medications: None       ALLERGIES:  No Known Allergies    FAMILY HISTORY:  Family History   Problem Relation Age of Onset     Coronary Artery Disease Sister        SOCIAL HISTORY:  Social History     Tobacco Use     Smoking status: Never Smoker     Smokeless tobacco: Never Used   Substance Use Topics     Alcohol use: Yes     Comment: Alcoholic Drinks/day: very rare     Drug use: No        VITALS:  Patient Vitals for the past 24 hrs:   BP Temp Temp src Pulse Resp SpO2 Weight   09/08/22 1500 (!) 163/60 -- -- 75 20 93 % --   09/08/22 1400 118/61 -- -- 78 20 99 % --   09/08/22 1232 137/75 98  F (36.7  C) Oral 82 18 98 % 56.7 kg (125 lb)       PHYSICAL EXAM    General Appearance: Well-appearing, thin, no acute distress   Head:  Normocephalic  Eyes:  conjunctiva/corneas clear  ENT:  membranes are moist without pallor  Neck:  Supple  Chest:  No tenderness or deformity, no crepitus  Cardio:  Regular rate and rhythm, no murmur/gallop/rub, 2+ pulses symmetric in all extremities  Pulm:  No respiratory distress, clear to auscultation bilaterally  Back: No CVA tenderness, normal ROM  Abdomen:  Soft, non-tender, non distended,no rebound or guarding.  Extremities: Moves all extremities normally, normal gait  Skin:  Skin warm, dry, no rashes  Neuro:  Alert and oriented  ×3, moving all extremities, no gross sensory defects     RADIOLOGY/LABS:  Reviewed all pertinent imaging. Please see official radiology report. All pertinent labs reviewed and interpreted.    Results for orders placed or performed during the hospital encounter of 09/08/22   Result Value Ref Range    Troponin I 0.02 0.00 - 0.29 ng/mL   B-Type Natriuretic Peptide ([facility] Only)   Result Value Ref Range    BNP 20 0 - 167 pg/mL   Comprehensive metabolic panel   Result Value Ref Range    Sodium 140 136 - 145 mmol/L    Potassium 3.7 3.5 - 5.0 mmol/L    Chloride 100 98 - 107 mmol/L    Carbon Dioxide (CO2) 33 (H) 22 - 31 mmol/L    Anion Gap 7 5 - 18 mmol/L    Urea Nitrogen 20 8 - 28 mg/dL    Creatinine 0.81 0.60 - 1.10 mg/dL    Calcium 10.0 8.5 - 10.5 mg/dL    Glucose 89 70 - 125 mg/dL    Alkaline Phosphatase 126 (H) 45 - 120 U/L    AST 27 0 - 40 U/L    ALT 24 0 - 45 U/L    Protein Total 7.2 6.0 - 8.0 g/dL    Albumin 4.3 3.5 - 5.0 g/dL    Bilirubin Total 0.6 0.0 - 1.0 mg/dL    GFR Estimate 71 >60 mL/min/1.73m2   Result Value Ref Range    Lipase 21 0 - 52 U/L   CBC with platelets and differential   Result Value Ref Range    WBC Count 8.5 4.0 - 11.0 10e3/uL    RBC Count 4.81 3.80 - 5.20 10e6/uL    Hemoglobin 15.0 11.7 - 15.7 g/dL    Hematocrit 44.2 35.0 - 47.0 %    MCV 92 78 - 100 fL    MCH 31.2 26.5 - 33.0 pg    MCHC 33.9 31.5 - 36.5 g/dL    RDW 12.4 10.0 - 15.0 %    Platelet Count 175 150 - 450 10e3/uL    % Neutrophils 50 %    % Lymphocytes 39 %    % Monocytes 10 %    % Eosinophils 1 %    % Basophils 0 %    % Immature Granulocytes 0 %    NRBCs per 100 WBC 0 <1 /100    Absolute Neutrophils 4.2 1.6 - 8.3 10e3/uL    Absolute Lymphocytes 3.3 0.8 - 5.3 10e3/uL    Absolute Monocytes 0.8 0.0 - 1.3 10e3/uL    Absolute Eosinophils 0.1 0.0 - 0.7 10e3/uL    Absolute Basophils 0.0 0.0 - 0.2 10e3/uL    Absolute Immature Granulocytes 0.0 <=0.4 10e3/uL    Absolute NRBCs 0.0 10e3/uL   Extra Blue Top Tube   Result Value Ref Range    Hold  Specimen JIC    Extra Red Top Tube   Result Value Ref Range    Hold Specimen JIC        EKG:  Performed at: 12:25    Impression: Sinus rhythm. Normal ECG    Rate: 74 BPM  Rhythm: Sinus   Axis: 4  ND Interval: 194 ms  QRS Interval: 72 ms  QTc Interval: 432 ms  ST Changes: NA  Comparison: When compared with ECG of 09-JAN-2020, premature ventricular complexes are no longer present.   I have independently reviewed and interpreted the EKG(s) documented above.      The creation of this record is based on the scribe s observations of the work being performed by Preeti Victoria MD and the provider s statements to them. It was created on his behalf by Aliza Us, a trained medical scribe. This document has been checked and approved by the attending provider.    Preeti Victoria MD  Emergency Medicine  [facility] EMERGENCY DEPARTMENT  [facility] [facility] [address]  [phone]  Dept: [phone]     Preeti Victoria MD  09/08/22 2999

## 2022-09-08 NOTE — ED TRIAGE NOTES
Pt had 2-3 hrs of chest pressure last evening, and it has continued today; rated at 5/10. Pt took several different GI medications that have not helped the pressure. She also reports a dry cough.

## 2022-09-08 NOTE — DISCHARGE INSTRUCTIONS
As we discussed recommend follow-up with your primary care doctor for referral for endoscopy and possible cardiology referral.  Recommend increasing your omeprazole to twice daily.  Recommend starting Pepcid twice a day.  Return to the ER for worsening symptoms

## 2022-09-08 NOTE — ED NOTES
EMERGENCY DEPARTMENT SIGN OUT NOTE        ED COURSE AND MEDICAL DECISION MAKING  3:00 PM Patient was signed out to me by Dr Preeti Victoria.  4:23 PM I introduced myself to the patient and updated her on results. Discussed plans for discharge.    In brief, Tayla Benz is a 86 year old female who initially presented to 3 hours of chest pressure last night that is progressed and continued all throughout the day.  Reports dry cough.  History of Root's esophagitis.  EKG and troponin are reassuring.    At time of sign out, disposition was pending CTA chest abdomen pelvis.  If CTA negative plan for discharge home with recommendation to increase omeprazole to twice a day, start Pepcid twice a day, and follow-up primary care doctor for referral for endoscopy           FINAL IMPRESSION    1. Chest pain, unspecified type    2. Uterine leiomyoma, unspecified location        ED MEDS  Medications   aspirin (ASA) chewable tablet 81 mg (81 mg Oral Given 9/8/22 1253)   lidocaine (viscous) (XYLOCAINE) 2 % 15 mL, alum & mag hydroxide-simethicone (MAALOX) 15 mL GI Cocktail (30 mLs Oral Given 9/8/22 1434)   iopamidol (ISOVUE-370) solution 100 mL (100 mLs Intravenous Given 9/8/22 1453)       LAB  Labs Ordered and Resulted from Time of ED Arrival to Time of ED Departure   COMPREHENSIVE METABOLIC PANEL - Abnormal       Result Value    Sodium 140      Potassium 3.7      Chloride 100      Carbon Dioxide (CO2) 33 (*)     Anion Gap 7      Urea Nitrogen 20      Creatinine 0.81      Calcium 10.0      Glucose 89      Alkaline Phosphatase 126 (*)     AST 27      ALT 24      Protein Total 7.2      Albumin 4.3      Bilirubin Total 0.6      GFR Estimate 71     TROPONIN I - Normal    Troponin I 0.02     B-TYPE NATRIURETIC PEPTIDE ([facility] ONLY) - Normal    BNP 20     LIPASE - Normal    Lipase 21     CBC WITH PLATELETS AND DIFFERENTIAL    WBC Count 8.5      RBC Count 4.81      Hemoglobin 15.0      Hematocrit 44.2      MCV 92      MCH 31.2       MCHC 33.9      RDW 12.4      Platelet Count 175      % Neutrophils 50      % Lymphocytes 39      % Monocytes 10      % Eosinophils 1      % Basophils 0      % Immature Granulocytes 0      NRBCs per 100 WBC 0      Absolute Neutrophils 4.2      Absolute Lymphocytes 3.3      Absolute Monocytes 0.8      Absolute Eosinophils 0.1      Absolute Basophils 0.0      Absolute Immature Granulocytes 0.0      Absolute NRBCs 0.0           RADIOLOGY    CTA Chest Abdomen Pelvis w Contrast   Final Result   IMPRESSION:   1.  Negative for dissections, aneurysms, and negative for pulmonary emboli.      2.  No acute findings in the chest, abdomen, or pelvis to explain the patient's pain.      3.  Uterine fibroids.             DISCHARGE MEDS  New Prescriptions    FAMOTIDINE (PEPCID) 10 MG TABLET    Take 1 tablet (10 mg) by mouth 2 times daily       I, Chris Higuera, am serving as a scribe to document services personally performed by Dr. Brendon Colon, based on my observations and the provider's statements to me.  I, Brendon Colon MD, attest that Chris Higuera is acting in a scribe capacity, has observed my performance of the services and has documented them in accordance with my direction.      Brendon Colon MD  Emergency Medicine  [facility] EMERGENCY DEPARTMENT  [facility] [facility] [id number] [phone]       Brendon Colon MD  09/08/22 5742

## 2022-09-08 NOTE — ED NOTES
ED Provider In Triage Note  [facility]  Encounter Date: Sep 8, 2022    Chief Complaint   Patient presents with     Chest Pain       Brief HPI:   Tayla Benz is a 86 year old female presenting to the Emergency Department with a chief complaint of chest pain. Patient states she started to have chest pressure off and on starting last night, continues now. It radiates to her back and rates it 5/10 on the pain scale. Similar symptoms with her acid reflux in the past.     Brief Physical Exam:  /75   Pulse 82   Temp 98  F (36.7  C) (Oral)   Resp 18   Wt 56.7 kg (125 lb)   SpO2 98%   BMI 23.62 kg/m    General: Non-toxic appearing  HEENT: Atraumatic  Resp: No respiratory distress  Abdomen: Non-peritoneal  Neuro: Alert, oriented, answers questions appropriately  Psych: Behavior appropriate      Plan Initiated in Triage:  Orders Placed This Encounter     CTA Chest Abdomen Pelvis w Contrast     Troponin I     B-Type Natriuretic Peptide ([facility] Only)     Comprehensive metabolic panel     Lipase     aspirin (ASA) chewable tablet 81 mg           PIT Dispo:   Place patient in the next available ED bed. Concern for ACS, less likely dissection, concern for GERD. Will obtain imaging and labs     YE PETIT MD on 9/8/2022 at 12:31 PM    Patient was evaluated by the Physician in Triage due to a limitation of available rooms in the Emergency Department. A plan of care was discussed based on the information obtained on the initial evaluation and patient was consuled to return back to the Emergency Department lobby after this initial evalutaiton until results were obtained or a room became available in the Emergency Department. Patient was counseled not to leave prior to receiving the results of their workup.     YE PETIT MD  [facility] EMERGENCY DEPARTMENT  [address] [place] [id number]  [phone]       Ye Petit MD  09/08/22  1231

## 2022-09-08 NOTE — ED TRIAGE NOTES
Triage Assessment     Row Name 09/08/22 1233       Triage Assessment (Adult)    Airway WDL WDL       Respiratory WDL    Respiratory WDL WDL       Skin Circulation/Temperature WDL    Skin Circulation/Temperature WDL WDL       Cardiac WDL    Cardiac WDL chest pain       Peripheral/Neurovascular WDL    Peripheral Neurovascular WDL WDL       Cognitive/Neuro/Behavioral WDL    Cognitive/Neuro/Behavioral WDL WDL

## 2022-09-10 LAB
ATRIAL RATE - MUSE: 74 BPM
DIASTOLIC BLOOD PRESSURE - MUSE: NORMAL MMHG
INTERPRETATION ECG - MUSE: NORMAL
P AXIS - MUSE: 45 DEGREES
PR INTERVAL - MUSE: 194 MS
QRS DURATION - MUSE: 72 MS
QT - MUSE: 390 MS
QTC - MUSE: 432 MS
R AXIS - MUSE: 4 DEGREES
SYSTOLIC BLOOD PRESSURE - MUSE: NORMAL MMHG
T AXIS - MUSE: 47 DEGREES
VENTRICULAR RATE- MUSE: 74 BPM

## 2022-09-16 ENCOUNTER — LAB REQUISITION (OUTPATIENT)
Dept: LAB | Facility: CLINIC | Age: 87
End: 2022-09-16

## 2022-09-16 DIAGNOSIS — E03.9 HYPOTHYROIDISM, UNSPECIFIED: ICD-10-CM

## 2022-09-16 PROCEDURE — 84443 ASSAY THYROID STIM HORMONE: CPT | Performed by: PHYSICIAN ASSISTANT

## 2022-09-17 LAB — TSH SERPL DL<=0.005 MIU/L-ACNC: 2.5 UIU/ML (ref 0.3–4.2)

## 2022-12-29 ENCOUNTER — HOSPITAL ENCOUNTER (OUTPATIENT)
Dept: RADIOLOGY | Facility: HOSPITAL | Age: 87
Discharge: HOME | End: 2022-12-29
Attending: ORTHOPAEDIC SURGERY | Admitting: ORTHOPAEDIC SURGERY
Payer: COMMERCIAL

## 2022-12-29 DIAGNOSIS — Z96.641 STATUS POST RIGHT HIP REPLACEMENT: ICD-10-CM

## 2022-12-29 PROCEDURE — 73502 X-RAY EXAM HIP UNI 2-3 VIEWS: CPT

## 2023-06-26 ENCOUNTER — LAB REQUISITION (OUTPATIENT)
Dept: LAB | Facility: CLINIC | Age: 88
End: 2023-06-26

## 2023-06-26 LAB
ALBUMIN SERPL BCG-MCNC: 4.4 G/DL (ref 3.5–5.2)
ALP SERPL-CCNC: 108 U/L (ref 35–104)
ALT SERPL W P-5'-P-CCNC: 15 U/L (ref 0–50)
ANION GAP SERPL CALCULATED.3IONS-SCNC: 12 MMOL/L (ref 7–15)
AST SERPL W P-5'-P-CCNC: 21 U/L (ref 0–45)
BILIRUB SERPL-MCNC: 0.4 MG/DL
BUN SERPL-MCNC: 12.8 MG/DL (ref 8–23)
CALCIUM SERPL-MCNC: 10.4 MG/DL (ref 8.8–10.2)
CHLORIDE SERPL-SCNC: 104 MMOL/L (ref 98–107)
CREAT SERPL-MCNC: 0.81 MG/DL (ref 0.51–0.95)
DEPRECATED HCO3 PLAS-SCNC: 27 MMOL/L (ref 22–29)
GFR SERPL CREATININE-BSD FRML MDRD: 70 ML/MIN/1.73M2
GLUCOSE SERPL-MCNC: 102 MG/DL (ref 70–99)
POTASSIUM SERPL-SCNC: 4.3 MMOL/L (ref 3.4–5.3)
PROT SERPL-MCNC: 6.5 G/DL (ref 6.4–8.3)
SODIUM SERPL-SCNC: 143 MMOL/L (ref 136–145)

## 2023-06-26 PROCEDURE — 80053 COMPREHEN METABOLIC PANEL: CPT | Performed by: FAMILY MEDICINE

## 2023-06-26 PROCEDURE — 87086 URINE CULTURE/COLONY COUNT: CPT | Performed by: FAMILY MEDICINE

## 2023-06-28 LAB — BACTERIA UR CULT: NORMAL

## 2023-07-14 ENCOUNTER — LAB REQUISITION (OUTPATIENT)
Dept: LAB | Facility: CLINIC | Age: 88
End: 2023-07-14

## 2023-07-14 DIAGNOSIS — E78.5 HYPERLIPIDEMIA, UNSPECIFIED: ICD-10-CM

## 2023-07-14 DIAGNOSIS — E03.9 HYPOTHYROIDISM, UNSPECIFIED: ICD-10-CM

## 2023-07-14 LAB
ALBUMIN SERPL BCG-MCNC: 4.4 G/DL (ref 3.5–5.2)
ALP SERPL-CCNC: 114 U/L (ref 35–104)
ALT SERPL W P-5'-P-CCNC: 20 U/L (ref 0–50)
ANION GAP SERPL CALCULATED.3IONS-SCNC: 10 MMOL/L (ref 7–15)
AST SERPL W P-5'-P-CCNC: 25 U/L (ref 0–45)
BILIRUB SERPL-MCNC: 0.5 MG/DL
BUN SERPL-MCNC: 14.1 MG/DL (ref 8–23)
CALCIUM SERPL-MCNC: 10.3 MG/DL (ref 8.8–10.2)
CHLORIDE SERPL-SCNC: 103 MMOL/L (ref 98–107)
CHOLEST SERPL-MCNC: 143 MG/DL
CREAT SERPL-MCNC: 0.81 MG/DL (ref 0.51–0.95)
DEPRECATED HCO3 PLAS-SCNC: 28 MMOL/L (ref 22–29)
GFR SERPL CREATININE-BSD FRML MDRD: 70 ML/MIN/1.73M2
GLUCOSE SERPL-MCNC: 113 MG/DL (ref 70–99)
HDLC SERPL-MCNC: 50 MG/DL
LDLC SERPL CALC-MCNC: 69 MG/DL
NONHDLC SERPL-MCNC: 93 MG/DL
POTASSIUM SERPL-SCNC: 4 MMOL/L (ref 3.4–5.3)
PROT SERPL-MCNC: 6.5 G/DL (ref 6.4–8.3)
SODIUM SERPL-SCNC: 141 MMOL/L (ref 136–145)
TRIGL SERPL-MCNC: 121 MG/DL
TSH SERPL DL<=0.005 MIU/L-ACNC: 1.86 UIU/ML (ref 0.3–4.2)

## 2023-07-14 PROCEDURE — 80053 COMPREHEN METABOLIC PANEL: CPT | Performed by: PHYSICIAN ASSISTANT

## 2023-07-14 PROCEDURE — 84443 ASSAY THYROID STIM HORMONE: CPT | Performed by: PHYSICIAN ASSISTANT

## 2023-07-14 PROCEDURE — 80061 LIPID PANEL: CPT | Performed by: PHYSICIAN ASSISTANT

## 2023-09-08 ENCOUNTER — TRANSFERRED RECORDS (OUTPATIENT)
Dept: HEALTH INFORMATION MANAGEMENT | Facility: CLINIC | Age: 88
End: 2023-09-08

## 2023-09-15 DIAGNOSIS — E78.2 MIXED HYPERLIPIDEMIA: ICD-10-CM

## 2023-09-15 RX ORDER — ATORVASTATIN CALCIUM 20 MG/1
TABLET, FILM COATED ORAL
Qty: 90 TABLET | Refills: 0 | OUTPATIENT
Start: 2023-09-15

## 2023-09-15 NOTE — TELEPHONE ENCOUNTER
Pt is overdue for cardiology follow-up. No appointment arranged. EMG no longer provider. Routed to PCP to request refill. NEEMARn

## 2023-09-18 ENCOUNTER — TELEPHONE (OUTPATIENT)
Dept: CARDIOLOGY | Facility: CLINIC | Age: 88
End: 2023-09-18
Payer: COMMERCIAL

## 2023-09-18 DIAGNOSIS — E78.2 MIXED HYPERLIPIDEMIA: ICD-10-CM

## 2023-09-18 DIAGNOSIS — R00.1 BRADYCARDIA: ICD-10-CM

## 2023-09-18 DIAGNOSIS — I49.3 PVC'S (PREMATURE VENTRICULAR CONTRACTIONS): ICD-10-CM

## 2023-09-18 DIAGNOSIS — I25.10 CORONARY ARTERY CALCIFICATION: Primary | ICD-10-CM

## 2023-09-18 NOTE — TELEPHONE ENCOUNTER
[facility] Call Center    Phone Message    May a detailed message be left on voicemail: yes     Reason for Call: Medication Question or concern regarding medication   Prescription Clarification  Name of Medication: atorvastatin (LIPITOR) 20 MG tablet   Prescribing Provider: Dr. Banks   Pharmacy:    What on the order needs clarification? Pt wants to know why was the medication rejected and not filled? Please reach out to further discuss      Action Taken: Other: Cardiology    Travel Screening: Not Applicable    Thank you!  [facility]

## 2023-09-19 NOTE — TELEPHONE ENCOUNTER
PC to PCP office, and review. Rx refill request was routed, and yet to address. Requested to have PCP fill as provider out of office, no longer with practice, pt overdue for follow-up with cardiology, and no appt scheduled. Dr. Angel' office aware of request. Will call pt to inform. CMM,Rn     PC with patient and informed of request to PCP to fill as EMG no longer in clinic. Pt reports that she has several days left, and will await call from pharmacy. Discussion pt overdue for follow-up. Had foot surgery today and requests to have call next week to arrange with new provider. No further questions at this time, and appreciative of the call. CMM,Rn

## 2023-11-28 ENCOUNTER — HOSPITAL ENCOUNTER (EMERGENCY)
Facility: HOSPITAL | Age: 88
Discharge: HOME | End: 2023-11-28
Attending: EMERGENCY MEDICINE | Admitting: EMERGENCY MEDICINE
Payer: COMMERCIAL

## 2023-11-28 ENCOUNTER — APPOINTMENT (OUTPATIENT)
Dept: CT IMAGING | Facility: HOSPITAL | Age: 88
End: 2023-11-28
Attending: EMERGENCY MEDICINE
Payer: COMMERCIAL

## 2023-11-28 ENCOUNTER — APPOINTMENT (OUTPATIENT)
Dept: MRI IMAGING | Facility: HOSPITAL | Age: 88
End: 2023-11-28
Attending: PHYSICIAN ASSISTANT
Payer: COMMERCIAL

## 2023-11-28 VITALS
DIASTOLIC BLOOD PRESSURE: 53 MMHG | OXYGEN SATURATION: 96 % | SYSTOLIC BLOOD PRESSURE: 129 MMHG | RESPIRATION RATE: 26 BRPM | BODY MASS INDEX: 23.98 KG/M2 | TEMPERATURE: 97.5 F | HEART RATE: 69 BPM | WEIGHT: 127 LBS | HEIGHT: 61 IN

## 2023-11-28 DIAGNOSIS — R41.3 TRANSIENT AMNESIA: ICD-10-CM

## 2023-11-28 DIAGNOSIS — R47.01 APHASIA: ICD-10-CM

## 2023-11-28 LAB
ANION GAP SERPL CALCULATED.3IONS-SCNC: 7 MMOL/L (ref 7–15)
APTT PPP: 31 SECONDS (ref 22–38)
BASOPHILS # BLD AUTO: 0 10E3/UL (ref 0–0.2)
BASOPHILS NFR BLD AUTO: 1 %
BUN SERPL-MCNC: 17.6 MG/DL (ref 8–23)
CALCIUM SERPL-MCNC: 10.2 MG/DL (ref 8.8–10.2)
CHLORIDE SERPL-SCNC: 102 MMOL/L (ref 98–107)
CREAT SERPL-MCNC: 0.81 MG/DL (ref 0.51–0.95)
DEPRECATED HCO3 PLAS-SCNC: 30 MMOL/L (ref 22–29)
EGFRCR SERPLBLD CKD-EPI 2021: 70 ML/MIN/1.73M2
EOSINOPHIL # BLD AUTO: 0.1 10E3/UL (ref 0–0.7)
EOSINOPHIL NFR BLD AUTO: 1 %
ERYTHROCYTE [DISTWIDTH] IN BLOOD BY AUTOMATED COUNT: 12.4 % (ref 10–15)
GLUCOSE BLDC GLUCOMTR-MCNC: 114 MG/DL (ref 70–99)
GLUCOSE SERPL-MCNC: 110 MG/DL (ref 70–99)
HCT VFR BLD AUTO: 41.7 % (ref 35–47)
HGB BLD-MCNC: 14.1 G/DL (ref 11.7–15.7)
HOLD SPECIMEN: NORMAL
IMM GRANULOCYTES # BLD: 0 10E3/UL
IMM GRANULOCYTES NFR BLD: 0 %
INR PPP: 1.03 (ref 0.85–1.15)
LYMPHOCYTES # BLD AUTO: 3 10E3/UL (ref 0.8–5.3)
LYMPHOCYTES NFR BLD AUTO: 39 %
MCH RBC QN AUTO: 31.6 PG (ref 26.5–33)
MCHC RBC AUTO-ENTMCNC: 33.8 G/DL (ref 31.5–36.5)
MCV RBC AUTO: 94 FL (ref 78–100)
MONOCYTES # BLD AUTO: 0.6 10E3/UL (ref 0–1.3)
MONOCYTES NFR BLD AUTO: 8 %
NEUTROPHILS # BLD AUTO: 3.9 10E3/UL (ref 1.6–8.3)
NEUTROPHILS NFR BLD AUTO: 51 %
NRBC # BLD AUTO: 0 10E3/UL
NRBC BLD AUTO-RTO: 0 /100
PLATELET # BLD AUTO: 203 10E3/UL (ref 150–450)
POTASSIUM SERPL-SCNC: 4.4 MMOL/L (ref 3.4–5.3)
RBC # BLD AUTO: 4.46 10E6/UL (ref 3.8–5.2)
SODIUM SERPL-SCNC: 139 MMOL/L (ref 135–145)
TROPONIN T SERPL HS-MCNC: 7 NG/L
WBC # BLD AUTO: 7.7 10E3/UL (ref 4–11)

## 2023-11-28 PROCEDURE — 250N000011 HC RX IP 250 OP 636: Mod: JZ | Performed by: EMERGENCY MEDICINE

## 2023-11-28 PROCEDURE — 84484 ASSAY OF TROPONIN QUANT: CPT | Performed by: EMERGENCY MEDICINE

## 2023-11-28 PROCEDURE — 85730 THROMBOPLASTIN TIME PARTIAL: CPT | Performed by: EMERGENCY MEDICINE

## 2023-11-28 PROCEDURE — 99285 EMERGENCY DEPT VISIT HI MDM: CPT | Mod: 25

## 2023-11-28 PROCEDURE — 82962 GLUCOSE BLOOD TEST: CPT

## 2023-11-28 PROCEDURE — 80048 BASIC METABOLIC PNL TOTAL CA: CPT | Performed by: EMERGENCY MEDICINE

## 2023-11-28 PROCEDURE — G0508 CRIT CARE TELEHEA CONSULT 60: HCPCS | Mod: G0 | Performed by: PHYSICIAN ASSISTANT

## 2023-11-28 PROCEDURE — 70496 CT ANGIOGRAPHY HEAD: CPT

## 2023-11-28 PROCEDURE — 85610 PROTHROMBIN TIME: CPT | Performed by: EMERGENCY MEDICINE

## 2023-11-28 PROCEDURE — A9585 GADOBUTROL INJECTION: HCPCS | Mod: JZ | Performed by: EMERGENCY MEDICINE

## 2023-11-28 PROCEDURE — 255N000002 HC RX 255 OP 636: Mod: JZ | Performed by: EMERGENCY MEDICINE

## 2023-11-28 PROCEDURE — 70498 CT ANGIOGRAPHY NECK: CPT

## 2023-11-28 PROCEDURE — 36415 COLL VENOUS BLD VENIPUNCTURE: CPT | Performed by: EMERGENCY MEDICINE

## 2023-11-28 PROCEDURE — 85025 COMPLETE CBC W/AUTO DIFF WBC: CPT | Performed by: EMERGENCY MEDICINE

## 2023-11-28 PROCEDURE — 93005 ELECTROCARDIOGRAM TRACING: CPT | Performed by: EMERGENCY MEDICINE

## 2023-11-28 PROCEDURE — 70553 MRI BRAIN STEM W/O & W/DYE: CPT

## 2023-11-28 RX ORDER — IOPAMIDOL 755 MG/ML
75 INJECTION, SOLUTION INTRAVASCULAR ONCE
Status: COMPLETED | OUTPATIENT
Start: 2023-11-28 | End: 2023-11-28

## 2023-11-28 RX ORDER — GADOBUTROL 604.72 MG/ML
6 INJECTION INTRAVENOUS ONCE
Status: COMPLETED | OUTPATIENT
Start: 2023-11-28 | End: 2023-11-28

## 2023-11-28 RX ADMIN — GADOBUTROL 6 ML: 604.72 INJECTION INTRAVENOUS at 15:10

## 2023-11-28 RX ADMIN — IOPAMIDOL 75 ML: 755 INJECTION, SOLUTION INTRAVENOUS at 13:58

## 2023-11-28 ASSESSMENT — ACTIVITIES OF DAILY LIVING (ADL): ADLS_ACUITY_SCORE: 35

## 2023-11-28 NOTE — ED TRIAGE NOTES
"Pt presents to ED with stroke symptoms that started 1 hour ago.  Pt was getting gas at [facility] and started to have blurred vision along with intermittent amnesia.  Pt did not know how she was getting home at the time.  Pt doesn't remember driving home.  Once home pt had a fall due to unsteady gait.  Pt has history of vertigo, but states \"this feels different\".  Provider called to triage for neuro exam.  Tier 1 called per Dr Burt.  .     Triage Assessment (Adult)       Row Name 11/28/23 4886          Triage Assessment    Airway WDL WDL        Respiratory WDL    Respiratory WDL WDL                     "

## 2023-11-28 NOTE — ED NOTES
Bed: [facility]  Expected date: 11/28/23  Expected time:   Means of arrival:   Comments:  HOLD for room 7 K.W.

## 2023-11-28 NOTE — ED PROVIDER NOTES
EMERGENCY DEPARTMENT ENCOUNTER      NAME: Tayla Benz  AGE: 87 year old female  YOB: 1935  MRN: [id number]  EVALUATION DATE & TIME: No admission date for patient encounter.    PCP: Osmin Guadarrama    ED PROVIDER: Liang Burt D.O.      Chief Complaint   Patient presents with    Stroke Symptoms       FINAL IMPRESSION:  1. Transient amnesia    2. Aphasia        ED COURSE & MEDICAL DECISION MAKIN:34 PM I met with the patient to gather history and to perform my initial exam. I discussed the plan for care while in the Emergency Department.  1:43 PM Discussed the case with the stroke team  2:04 PM Discussed the case with [facility]  2:19 PM Discussed the case with the stroke team. De-escalate stroke code, they will take patient to MRI  2:37 PM Rechecked patient, discussed plan of MRI, they are agreeable at this time  3:13 PM patient care turned over to Dr. Farr         Pertinent Labs & Imaging studies reviewed. (See chart for details)  87 year old female presents to the Emergency Department for evaluation of brief loss of ability to remember things, and some visual changes.  Granddaughter also felt that she was having some mild aphasia.  There is no definitive findings on my neuroexam.  However as the symptoms had only been ongoing for 1 hour we did call a tier 1 stroke code and the patient was taken emergently to CT imaging.  CT and CTA are both negative and the patient has been evaluated by stroke neurology.  At this time the believe that the stroke code can be dizzy escalated, and the patient is not a candidate for tPA or intervention.  However they do believe an MRI to evaluate for the potential for stroke would be appropriate.  At time of turnover MRI is still pending, and final disposition will be pending results of imaging.    Medical Decision Making    History:  Supplemental history from: Documented in chart, if applicable and Family Member/Significant Other  External  Record(s) reviewed: Documented in chart, if applicable.    Work Up:  Chart documentation includes differential considered and any EKGs or imaging independently interpreted by provider, where specified.  In additional to work up documented, I considered the following work up: Documented in chart, if applicable.    External consultation:  Discussion of management with another provider: Documented in chart, if applicable    Complicating factors:  Care impacted by chronic illness: N/A  Care affected by social determinants of health: N/A    Disposition considerations: Admission considered. Patient was signed out to the oncoming physician, disposition pending.        At the conclusion of the encounter I discussed the results of all of the tests and the disposition. The questions were answered. The patient or family acknowledged understanding and was agreeable with the care plan.      HPI    Patient information was obtained from: patient and patient's grand daughter     Use of : N/A        Tayla Benz is a 87 year old female who presents with stroke symptoms.    Around ~1 hour ago, the patient was at [employer] [facility] gas, when she had the sudden confusion of how she would get home along with bluu. Later once she got home, she had unsteady gait and fell. When questioned, patient does not remember coming home from [employer]. Now endorses mild dizziness, difficulty finding right words, and off-balance. She denies any asymmetrical weakness or side preference.       REVIEW OF SYSTEMS  Constitutional:  Denies fever, chills, weight loss or weakness  Eyes:  No pain, discharge, redness  HENT:  Denies sore throat, ear pain, congestion  Respiratory: No SOB, wheeze or cough  Cardiovascular:  No CP, palpitations  GI:  Denies abdominal pain, nausea, vomiting, diarrhea  : Denies dysuria, hematuria  Musculoskeletal:  Denies any new muscle/joint pain, swelling or loss of function.  Skin:  Denies rash, pallor  Neurologic:   Denies headache, focal weakness or sensory changes Dizziness, visual blurriness, word finding difficulty, slow responses, and off-balance  Lymph: Denies swollen nodes    All other systems negative unless noted in HPI.    PAST MEDICAL HISTORY:  Past Medical History:   Diagnosis Date    Acute bronchitis     Root esophagus     BPPV (benign paroxysmal positional vertigo)     Carpal tunnel syndrome     Congenital hip dysplasia     Dysphasia     GERD (gastroesophageal reflux disease)     [place]oven[place] measles     History of transfusion     hip surgery years ago    Hypothyroidism     Murmur     PVC (premature ventricular contraction)     Shingles        PAST SURGICAL HISTORY:  Past Surgical History:   Procedure Laterality Date    CHOLECYSTECTOMY      ENDOMETRIAL BIOPSY  1995    for post menopausal bleeding    FOOT SURGERY Right     HIP SURGERY Right     X4    TN ESOPHAGOGASTRODUODENOSCOPY TRANSORAL DIAGNOSTIC N/A 3/6/2018    Procedure: ESOPHAGOGASTRODUODENOSCOPY WITH DILATATION;  Surgeon: Eleno Moyer MD;  Location: [facility] [place];  Service: Gastroenterology    TN ESOPHAGOGASTRODUODENOSCOPY TRANSORAL DIAGNOSTIC N/A 4/3/2019    Procedure: UPPER ENDOSCOPY WITH DILATION;  Surgeon: Eleno Moyer MD;  Location: [facility];  Service: Gastroenterology         CURRENT MEDICATIONS:    No current facility-administered medications for this encounter.     Current Outpatient Medications   Medication    aspirin 81 MG EC tablet    atorvastatin (LIPITOR) 20 MG tablet    b complex vitamins tablet    cod liver oil cap    coenzyme Q10 (CO Q-10) 100 mg capsule    diazePAM (VALIUM) 5 MG tablet    famotidine (PEPCID) 10 MG tablet    fluocinonide (LIDEX) 0.05 % cream    levothyroxine (SYNTHROID, LEVOTHROID) 25 MCG tablet    loratadine (CLARITIN) 10 mg tablet    LUTEIN ORAL    meclizine (ANTIVERT) 25 mg tablet    multivitamin with minerals tablet    omega 3-dha-epa-fish oil 1,000 mg (120 mg-180 mg) cap    OMEGA-3/DHA/EPA/FISH OIL (FISH  "OIL-OMEGA-3 FATTY ACIDS) 300-1,000 mg capsule    omeprazole (PRILOSEC) 20 MG DR capsule    psyllium with dextrose (METAMUCIL JAR) powder    ranitidine (ZANTAC) 150 MG capsule         ALLERGIES:  No Known Allergies    FAMILY HISTORY:  Family History   Problem Relation Age of Onset    Coronary Artery Disease Sister        SOCIAL HISTORY:  Social History     Socioeconomic History    Marital status:    Tobacco Use    Smoking status: Never    Smokeless tobacco: Never   Substance and Sexual Activity    Alcohol use: Yes     Comment: Alcoholic Drinks/day: very rare    Drug use: No       VITALS:  Patient Vitals for the past 24 hrs:   BP Temp Temp src Pulse Resp SpO2 Height Weight   11/28/23 1600 129/53 -- -- 69 -- 96 % -- --   11/28/23 1439 (!) 144/66 -- -- 68 26 98 % -- --   11/28/23 1429 (!) 140/63 -- -- 68 23 99 % -- --   11/28/23 1422 -- -- -- 71 22 97 % -- --   11/28/23 1416 (!) 158/65 -- -- 71 15 98 % -- --   11/28/23 1359 (!) 172/74 -- -- 74 -- 98 % -- --   11/28/23 1357 -- -- -- 78 17 97 % -- --   11/28/23 1355 (!) 140/63 -- -- 85 22 -- -- --   11/28/23 1332 (!) 173/78 97.5  F (36.4  C) Temporal 78 16 99 % 1.549 m (5' 1\") 57.6 kg (127 lb)       PHYSICAL EXAM    VITAL SIGNS: /53   Pulse 69   Temp 97.5  F (36.4  C) (Temporal)   Resp 26   Ht 1.549 m (5' 1\")   Wt 57.6 kg (127 lb)   SpO2 96%   BMI 24.00 kg/m      General Appearance: Well-appearing, well-nourished, no acute distress   Head:  Normocephalic, without obvious abnormality, atraumatic  Eyes:  PERRL, conjunctiva/corneas clear, EOM's intact,  ENT:  Lips, mucosa, and tongue normal, membranes are moist without pallor  Neck:  Normal ROM, symmetrical, trachea midline    Cardio:  Regular rate and rhythm, no murmur, rub or gallop, 2+ pulses symmetric in all extremities  Pulm:  Clear to auscultation bilaterally, respirations unlabored,  Abdomen:  Soft, non-tender, no rebound or guarding.  Musculoskeletal: Full ROM, no edema, no cyanosis, good ROM of " major joints  Integument:  Warm, Dry, No erythema, No rash.    Neurologic: Patient is alert and oriented ×3.  Face is symmetric.  Speech is normal.  Visual fields are full.  Cranial nerves II through XII are grossly intact. Motor tone is 5/5 and equal in both upper and lower extremities.  Bilateral symmetric sensation grossly intact upper and lower.  Neg finger-nose. Neg heel-shin.  Neg Rhomberg. Reflexes are 2/4 throughout.  Toes are downgoing. No dysdiadokinesia.    Psychiatric:  Affect normal, Judgment normal, Mood normal.      National Institutes of Health Stroke Scale  Exam Interval: Baseline   Score    Level of consciousness: (0)   Alert, keenly responsive    LOC questions: (0)   Answers both questions correctly    LOC commands: (0)   Performs both tasks correctly    Best gaze: (0)   Normal    Visual: (0)   No visual loss    Facial palsy: (0)   Normal symmetrical movements    Motor arm (left): (0)   No drift    Motor arm (right): (0)   No drift    Motor leg (left): (0)   No drift    Motor leg (right): (0)   No drift    Limb ataxia: (0)   Absent    Sensory: (0)   Normal- no sensory loss    Best language: (0)   Normal- no aphasia    Dysarthria: (0)   Normal    Extinction and inattention: (0)   No abnormality        Total Score:  0        LABS  Results for orders placed or performed during the hospital encounter of 11/28/23 (from the past 24 hour(s))   Glucose by meter   Result Value Ref Range    GLUCOSE BY METER POCT 114 (H) 70 - 99 mg/dL   CTA Head Neck with Contrast    Narrative    EXAM: CTA HEAD NECK W CONTRAST  LOCATION: [facility]  DATE: 11/28/2023    INDICATION: Code Stroke to evaluate for potential thrombolysis and thrombectomy. PLEASE READ IMMEDIATELY.  COMPARISON: None.  CONTRAST: IsoVue 370 75mL  TECHNIQUE: Head and neck CT angiogram with IV contrast. Noncontrast head CT followed by axial helical CT images of the head and neck vessels obtained during the arterial phase of  intravenous contrast administration. Axial 2D reconstructed images and   multiplanar 3D MIP reconstructed images of the head and neck vessels were performed by the technologist. Dose reduction techniques were used. All stenosis measurements made according to NASCET criteria unless otherwise specified.    FINDINGS:   NONCONTRAST HEAD CT:   INTRACRANIAL CONTENTS: No acute intracranial hemorrhage. No hydrocephalus or extra-axial hemorrhage. No acute loss of gray-white differentiation identified. Osseous calvarium is intact. Mild to moderate chronic small vessel ischemic changes with mild   global cortical volume loss and ex vacuo dilatation of the ventricular system.        VISUALIZED ORBITS/SINUSES/MASTOIDS: No intraorbital abnormality. No paranasal sinus mucosal disease. No middle ear or mastoid effusion.    BONES/SOFT TISSUES: No acute abnormality.    HEAD CTA:  ANTERIOR CIRCULATION: No stenosis/occlusion, aneurysm, or high flow vascular malformation. Standard [place] anatomy.    POSTERIOR CIRCULATION: No stenosis/occlusion, aneurysm, or high flow vascular malformation. Balanced vertebral arteries supply a normal basilar artery.     DURAL VENOUS SINUSES: Expected enhancement of the major dural venous sinuses.    NECK CTA:  RIGHT CAROTID: No measurable stenosis or dissection.    LEFT CAROTID: No measurable stenosis or dissection.    VERTEBRAL ARTERIES: No focal stenosis or dissection. Balanced vertebral arteries.    AORTIC ARCH: Classic aortic arch anatomy with no significant stenosis at the origin of the great vessels.    NONVASCULAR STRUCTURES: Unremarkable.      Impression    IMPRESSION:   HEAD CT:  1.  Normal head CT.    HEAD CTA:   1.  No large vessel occlusion findings.    NECK CTA:  1.  No acute vascular injury. No hemodynamically significant stenosis in the neck.    2.  Case discussed with Dr. Burt at 2:04 PM.   CBC with Platelets & Differential    Narrative    The following orders were created for  panel order CBC with Platelets & Differential.  Procedure                               Abnormality         Status                     ---------                               -----------         ------                     CBC with platelets and d...[[id number]]                      Final result                 Please view results for these tests on the individual orders.   Basic metabolic panel   Result Value Ref Range    Sodium 139 135 - 145 mmol/L    Potassium 4.4 3.4 - 5.3 mmol/L    Chloride 102 98 - 107 mmol/L    Carbon Dioxide (CO2) 30 (H) 22 - 29 mmol/L    Anion Gap 7 7 - 15 mmol/L    Urea Nitrogen 17.6 8.0 - 23.0 mg/dL    Creatinine 0.81 0.51 - 0.95 mg/dL    GFR Estimate 70 >60 mL/min/1.73m2    Calcium 10.2 8.8 - 10.2 mg/dL    Glucose 110 (H) 70 - 99 mg/dL   INR   Result Value Ref Range    INR 1.03 0.85 - 1.15   Partial thromboplastin time   Result Value Ref Range    aPTT 31 22 - 38 Seconds   Troponin T, High Sensitivity   Result Value Ref Range    Troponin T, High Sensitivity 7 <=14 ng/L   CBC with platelets and differential   Result Value Ref Range    WBC Count 7.7 4.0 - 11.0 10e3/uL    RBC Count 4.46 3.80 - 5.20 10e6/uL    Hemoglobin 14.1 11.7 - 15.7 g/dL    Hematocrit 41.7 35.0 - 47.0 %    MCV 94 78 - 100 fL    MCH 31.6 26.5 - 33.0 pg    MCHC 33.8 31.5 - 36.5 g/dL    RDW 12.4 10.0 - 15.0 %    Platelet Count 203 150 - 450 10e3/uL    % Neutrophils 51 %    % Lymphocytes 39 %    % Monocytes 8 %    % Eosinophils 1 %    % Basophils 1 %    % Immature Granulocytes 0 %    NRBCs per 100 WBC 0 <1 /100    Absolute Neutrophils 3.9 1.6 - 8.3 10e3/uL    Absolute Lymphocytes 3.0 0.8 - 5.3 10e3/uL    Absolute Monocytes 0.6 0.0 - 1.3 10e3/uL    Absolute Eosinophils 0.1 0.0 - 0.7 10e3/uL    Absolute Basophils 0.0 0.0 - 0.2 10e3/uL    Absolute Immature Granulocytes 0.0 <=0.4 10e3/uL    Absolute NRBCs 0.0 10e3/uL   [place] Draw    Narrative    The following orders were created for panel order [place] Draw.  Procedure                                Abnormality         Status                     ---------                               -----------         ------                     Extra Red Top Tube[[id number]]                               Final result                 Please view results for these tests on the individual orders.   Extra Red Top Tube   Result Value Ref Range    Hold Specimen JIC    MR Brain w/o & w Contrast    Narrative    EXAM: MR BRAIN W/O and W CONTRAST  LOCATION: [facility]  DATE: 11/28/2023    INDICATION: transient global confusion, wavy vision, imbalance  COMPARISON: CTA head and neck 11/28/2023 and MRI brain 03/26/2021  CONTRAST: 6ml gadavist  TECHNIQUE: Routine multiplanar multisequence head MRI without and with intravenous contrast.    FINDINGS:  INTRACRANIAL CONTENTS: No acute or subacute infarct. No mass, acute hemorrhage, or extra-axial fluid collections. Patchy nonspecific T2/FLAIR hyperintensities within the cerebral white matter most consistent with mild to moderate chronic microvascular   ischemic change. Moderate generalized cerebral atrophy. No hydrocephalus. Normal position of the cerebellar tonsils. No pathologic contrast enhancement.    SELLA: No abnormality accounting for technique.    OSSEOUS STRUCTURES/SOFT TISSUES: Normal marrow signal. The major intracranial vascular flow voids are maintained.     ORBITS: No abnormality accounting for technique.     SINUSES/MASTOIDS: No paranasal sinus mucosal disease. No middle ear or mastoid effusion.       Impression    IMPRESSION:  1.  No acute intracranial process.  2.  Generalized brain atrophy and presumed microvascular ischemic changes as detailed above.         RADIOLOGY  MR Brain w/o & w Contrast   Final Result   IMPRESSION:   1.  No acute intracranial process.   2.  Generalized brain atrophy and presumed microvascular ischemic changes as detailed above.      CTA Head Neck with Contrast   Final Result   IMPRESSION:    HEAD CT:   1.   Normal head CT.      HEAD CTA:    1.  No large vessel occlusion findings.      NECK CTA:   1.  No acute vascular injury. No hemodynamically significant stenosis in the neck.      2.  Case discussed with Dr. Burt at 2:04 PM.             EKG:    Rate: 70bpm  Rhythm: Normal Sinus Rhythm  Axis: Normal  Interval: Normal  Conduction: Normal  QRS: Normal  ST: Normal  T-wave: Normal  QT: Not prolonged  Comparison EKG: no significant change compared to 8 Sep 2022  Impression:  No acute ischemic change   I have independently reviewed and interpreted today's EKG, pending Cardiologist read      MEDICATIONS GIVEN IN THE EMERGENCY:  Medications   iopamidol (ISOVUE-370) solution 75 mL (75 mLs Intravenous $Given 11/28/23 1358)   gadobutrol (GADAVIST) injection 6 mL (6 mLs Intravenous $Given 11/28/23 1510)       NEW PRESCRIPTIONS STARTED AT TODAY'S ER VISIT  Discharge Medication List as of 11/28/2023  4:10 PM           I, Thelma Ayala, am serving as a scribe to document services personally performed by Liang Burt D.O., based on my observations and the provider's statements to me.  I, Liang Burt D.O., attest that Thelma Ayala is acting in a scribe capacity, has observed my performance of the services and has documented them in accordance with my direction.     Liang Burt D.O.  Emergency Medicine  [facility] EMERGENCY DEPARTMENT  [address] [place] [id number]  [phone]  Dept: [phone]       Liang Burt,   11/29/23 0704

## 2023-11-28 NOTE — CONSULTS
"[facility]    Stroke Consult Note    Reason for Consult: Stroke Code     Chief Complaint: Stroke Symptoms      HPI  Tayla Benz is a 87 year old female with pertinent past medical history of hypothyroidism, Solomon Islander measles, b/l cataracts, CAD, aortic valve sclerosis, history of basal cell carcinoma, dysphagia, bilateral tinnitus.    She presented to [facility] emergency department today due to acute onset approximately 12:20 PM this afternoon while filling gas tank of global amnesia/confusion.  She did not know where she was or where she was going.  Did have some associated blurred vision, she was able to drive herself home but does not remember driving home.  When she arrived at home she called to get help.  In the ED no true focal deficits appreciated on ED provider exam but family does state that she has some slowed responses to questions, unclear if true aphasia.  ED provider states she does not display any repetitive questioning.  /78.    She was seen today via telemedicine for stroke code. She describes event as:    She left her daughter-in-laws after having her hair trimmed. She started feeling funny and couldn't remember why she got onto . She was able to make it to [employer] and attempted to get gas but it took her a long time to figure out how to do it because she couldn't see straight to the gallons/price or credit card slot. When asked if she felt the issue with filling gas was due to confusion with how to use the machine or if she couldn't see it properly she confirms she couldn't see it properly as felt her vision was blurred and \"wavy\". She denies any room spinning sensation. She was able to drive home and once she was at home she tried to walk into the house and tripped over the flower area. She states the issue with falling was more due to the vision issues. She does remember the full episode except for a portion of when she turned onto one of the " "roads. She has had vertigo in the past and states that this feels different. No history of seizures. She does report an associated b/l frontal headache.    Vitals  BP: (!) 158/65   Pulse: 71   Resp: 22   Temp: 97.5  F (36.4  C)   Weight: 57.6 kg (127 lb)    Imaging Findings  HEAD CT:  1.  Normal head CT.     HEAD CTA:   1.  No large vessel occlusion findings.     NECK CTA:  1.  No acute vascular injury. No hemodynamically significant stenosis in the neck.    Intravenous Thrombolysis  Not given due to:   - minor/isolated/quickly resolving symptoms    Endovascular Treatment  Not initiated due to absence of proximal vessel occlusion    Impression  Transient confusion/?possible imbalance, wavy/blurred vision, frontal headache, denies room spinning, does have history of vertigo but this felt different, no repetitive questioning and able to recall most of the events that occurred during episode so lower suspicion of Transient global amnesia, will evaluate for any restricted diffusion in hippocampus, symptoms less suspicious for stroke as well but will rule out, versus possible complex migraine or seizure, rule out cardiac etiology    Recommendations   -ECG pending, would rule out cardiac etiology/ACS  -recommend MRI brain with and without contrast with thin cuts through brain stem/hippocampus, if negative for restricted diffusion then recommend follow-up with general neurology for further evaluation    Case discussed with vascular neurology attending Dr. Perez.    Patient Follow-up     - final recommendation pending work-up    Thank you for this consult.      Penelope Cuevas PA-C  Vascular Neurology    To page me or covering stroke neurology team member, click here: [facility]  Choose \"On Call\" tab at top, then select \"NEUROLOGY/ALL SITES\" from middle drop-down box, press Enter, then look for \"stroke\" or \"telestroke\" for your site.  ______________________________________________________    Clinically Significant Risk Factors " Present on Admission           # Hypercalcemia: Highest Ca = 10.2 mg/dL in last 2 days, will monitor as appropriate      # Drug Induced Platelet Defect: home medication list includes an antiplatelet medication                   Past Medical History   Past Medical History:   Diagnosis Date    Acute bronchitis     Root esophagus     BPPV (benign paroxysmal positional vertigo)     Carpal tunnel syndrome     Congenital hip dysplasia     Dysphasia     GERD (gastroesophageal reflux disease)     Ugandan measles     History of transfusion     hip surgery years ago    Hypothyroidism     Murmur     PVC (premature ventricular contraction)     Shingles      Past Surgical History   Past Surgical History:   Procedure Laterality Date    CHOLECYSTECTOMY      ENDOMETRIAL BIOPSY  1995    for post menopausal bleeding    FOOT SURGERY Right     HIP SURGERY Right     X4    WA ESOPHAGOGASTRODUODENOSCOPY TRANSORAL DIAGNOSTIC N/A 3/6/2018    Procedure: ESOPHAGOGASTRODUODENOSCOPY WITH DILATATION;  Surgeon: Eleno Moyer MD;  Location: [facility];  Service: Gastroenterology    WA ESOPHAGOGASTRODUODENOSCOPY TRANSORAL DIAGNOSTIC N/A 4/3/2019    Procedure: UPPER ENDOSCOPY WITH DILATION;  Surgeon: Eleno Moyer MD;  Location: [facility];  Service: Gastroenterology     Medications   Home Meds  Prior to Admission medications    Medication Sig Start Date End Date Taking? Authorizing Provider   aspirin 81 MG EC tablet [ASPIRIN 81 MG EC TABLET] Take 1 tablet (81 mg total) by mouth daily.  Patient not taking: Reported on 7/20/2022 11/12/20   Sarah Banks MD   atorvastatin (LIPITOR) 20 MG tablet TAKE 1 TABLET BY MOUTH AT BEDTIME 9/28/22   Sarah Banks MD   b complex vitamins tablet [B COMPLEX VITAMINS TABLET]  3/1/19   Provider, Historical   cod liver oil cap [COD LIVER OIL CAP] Take 1 capsule by mouth daily.  Patient not taking: Reported on 7/20/2022 12/4/19   Provider, Historical   coenzyme Q10 (CO Q-10) 100 mg capsule  [COENZYME Q10 (CO Q-10) 100 MG CAPSULE] Take 1 capsule (100 mg total) by mouth 2 (two) times a day. 11/12/20   Sarah Banks MD   diazePAM (VALIUM) 5 MG tablet [DIAZEPAM (VALIUM) 5 MG TABLET] Take 1-2 tablets (5-10 mg total) by mouth once in imaging for anxiety. Fill at preferred pharmacy and bring to MRI appointment. Do not take prior to arriving. You will need a  to bring you home after your appointment. 12/4/19   Sarah Banks MD   famotidine (PEPCID) 10 MG tablet Take 1 tablet (10 mg) by mouth 2 times daily 9/8/22   Brendon Colon MD   fluocinonide (LIDEX) 0.05 % cream [FLUOCINONIDE (LIDEX) 0.05 % CREAM] Apply 1 application topically as needed.  3/2/18   Provider, Historical   levothyroxine (SYNTHROID, LEVOTHROID) 25 MCG tablet [LEVOTHYROXINE (SYNTHROID, LEVOTHROID) 25 MCG TABLET] Take 25 mcg by mouth daily. 3/2/18   Provider, Historical   loratadine (CLARITIN) 10 mg tablet [LORATADINE (CLARITIN) 10 MG TABLET] Take 10 mg by mouth daily as needed.  3/2/18   Provider, Historical   LUTEIN ORAL [LUTEIN ORAL] as needed.   Patient not taking: Reported on 7/20/2022 3/20/19   Provider, Historical   meclizine (ANTIVERT) 25 mg tablet [MECLIZINE (ANTIVERT) 25 MG TABLET] Take 1 tablet (25 mg total) by mouth 3 (three) times a day as needed. 9/12/19   Camden Powell MD   multivitamin with minerals tablet [MULTIVITAMIN WITH MINERALS TABLET] Take 1 tablet by mouth daily. 8/12/19   Provider, Historical   omega 3-dha-epa-fish oil 1,000 mg (120 mg-180 mg) cap [OMEGA 3-DHA-EPA-FISH OIL 1,000 MG (120 MG-180 MG) CAP]  3/1/19   Provider, Historical   OMEGA-3/DHA/EPA/FISH OIL (FISH OIL-OMEGA-3 FATTY ACIDS) 300-1,000 mg capsule [OMEGA-3/DHA/EPA/FISH OIL (FISH OIL-OMEGA-3 FATTY ACIDS) 300-1,000 MG CAPSULE] Take 2 g by mouth daily.  Patient not taking: Reported on 7/20/2022 3/2/18   Provider, Historical   omeprazole (PRILOSEC) 20 MG DR capsule Take 1 capsule (20 mg) by mouth 2 times daily 9/8/22   Brendon Colon MD    psyllium with dextrose (METAMUCIL JAR) powder [PSYLLIUM WITH DEXTROSE (METAMUCIL JAR) POWDER] Take by mouth daily.  Patient not taking: Reported on 7/20/2022 3/2/18   Provider, Historical   ranitidine (ZANTAC) 150 MG capsule [RANITIDINE (ZANTAC) 150 MG CAPSULE] Take 150 mg by mouth 2 (two) times a day.  Patient not taking: Reported on 7/20/2022 3/2/18   Provider, Historical       Scheduled Meds      Infusion Meds      PRN Meds      Allergies   No Known Allergies  Family History   Family History   Problem Relation Age of Onset    Coronary Artery Disease Sister      Social History   Social History     Tobacco Use    Smoking status: Never    Smokeless tobacco: Never   Substance Use Topics    Alcohol use: Yes     Comment: Alcoholic Drinks/day: very rare    Drug use: No       Review of Systems   The 10 point Review of Systems is negative other than noted in the HPI or here.        PHYSICAL EXAMINATION  Temp:  [97.5  F (36.4  C)] 97.5  F (36.4  C)  Pulse:  [71-85] 71  Resp:  [15-22] 22  BP: (140-173)/(63-78) 158/65  SpO2:  [97 %-99 %] 97 %     General Exam  General:  patient lying in bed without any acute distress    HEENT:  normocephalic/atraumatic  Pulmonary:  no respiratory distress    Neuro Exam  Mental Status:  alert, oriented x 3, follows commands, speech clear and fluent, naming and repetition normal  Cranial Nerves:  visual fields intact (tested by nurse), EOMI with normal smooth pursuit, facial sensation intact and symmetric (tested by nurse), facial movements symmetric, hearing not formally tested but intact to conversation, no dysarthria, tongue protrusion midline  Motor:  no abnormal movements, able to move all limbs antigravity spontaneously with no signs of hemiparesis observed, no pronator drift   Reflexes:  unable to test (telestroke)  Sensory:  light touch sensation intact and symmetric throughout upper and lower extremities (assessed by nurse), no extinction on double simultaneous stimulation (assessed  "by nurse)  Coordination:  normal finger-to-nose and heel-to-shin bilaterally without dysmetria  Station/Gait: able to sit/stand without room spinning sensation, does feel some mild unsteadiness but able to walk independently    Dysphagia Screen  Per Nursing    Stroke Scales    NIHSS  1a. Level of Consciousness 0-->Alert, keenly responsive   1b. LOC Questions 0-->Answers both questions correctly   1c. LOC Commands 0-->Performs both tasks correctly   2.   Best Gaze 0-->Normal   3.   Visual 0-->No visual loss   4.   Facial Palsy 0-->Normal symmetrical movements   5a. Motor Arm, Left 0-->No drift, limb holds 90 (or 45) degrees for full 10 secs   5b. Motor Arm, Right 0-->No drift, limb holds 90 (or 45) degrees for full 10 secs   6a. Motor Leg, Left 0-->No drift, leg holds 30 degree position for full 5 secs   6b. Motor Leg, right 0-->No drift, leg holds 30 degree position for full 5 secs   7.   Limb Ataxia 0-->Absent   8.   Sensory 0-->Normal, no sensory loss   9.   Best Language 0-->No aphasia, normal   10. Dysarthria 0-->Normal   11. Extinction and Inattention  0-->No abnormality   Total 0 (11/28/23 1359)       Imaging  I personally reviewed all imaging; relevant findings per HPI.     Lab Results Data   CBC  Recent Labs   Lab 11/28/23  1402   WBC 7.7   RBC 4.46   HGB 14.1   HCT 41.7        Basic Metabolic Panel    Recent Labs   Lab 11/28/23  1402 11/28/23  1337     --    POTASSIUM 4.4  --    CHLORIDE 102  --    CO2 30*  --    BUN 17.6  --    CR 0.81  --    * 114*   BJ 10.2  --      Liver Panel  No results for input(s): \"PROTTOTAL\", \"ALBUMIN\", \"BILITOTAL\", \"ALKPHOS\", \"AST\", \"ALT\", \"BILIDIRECT\" in the last 168 hours.  INR  No lab results found.   Lipid Profile    Recent Labs   Lab Test 07/14/23  1021 07/20/22  1041 09/15/21  0813   CHOL 143 152 154   HDL 50 46* 41*   LDL 69 69 76   TRIG 121 185* 185*     A1C  No lab results found.  Troponin    Recent Labs   Lab 11/28/23  1402   CTROPT 7      "     Stroke Code Data Data   Stroke Code Data  (for stroke code with tele)  Stroke code activated 11/28/23  1338   First stroke provider response 11/28/23  1341   Video start time 11/28/23  1359   Video end time 11/28/23  1417   Last known normal 11/28/23  1219   Time of discovery (or onset of symptoms)  11/28/23  1220   Head CT read by Stroke Neuro Provider 11/28/23  1351   Was stroke code de-escalated? Yes  11/28/23  1419     Telestroke Service Details  Type of service telemedicine diagnostic assessment of acute neurological changes   Reason telemedicine is appropriate patient requires assessment with a specialist for diagnosis and treatment of neurological symptoms   Mode of transmission secure interactive audio and video communication per [place]   Originating site (patient location) [facility]    Distant site (provider location) [facility]       I personally examined and evaluated the patient today. At the time of my evaluation and management the patient was in critical condition today due to stroke code. I personally managed review of chart, medical record, meds, imaging, history, exam, and discussion with attending regarding plan and documentation. I spent a total of 70 minutes providing critical care services, evaluating the patient, directing care and reviewing laboratory values and radiologic reports.

## 2023-11-28 NOTE — ED PROVIDER NOTES
EMERGENCY DEPARTMENT SIGN OUT NOTE        ED COURSE AND MEDICAL DECISION MAKING   3:18 PM Patient was signed out to me by Dr Liang Burt.  In brief, Tayla Benz is a 87 year old female who initially presented for memory difficulty and question of mild aphasia.  MRI is pending.  3:51 PM MRI negative for acute findings.  Will discuss with stroke neurology  4:06 PM care discussed with stroke neurology, patient is stable for discharge with outpatient follow-up with general neurology for possible atypical migraine, seizure less likely, possible transient global amnesia but patient's memory for events seems to be improved.    FINAL IMPRESSION    1. Transient amnesia    2. Aphasia        ED MEDS  Medications   iopamidol (ISOVUE-370) solution 75 mL (75 mLs Intravenous $Given 11/28/23 2360)   gadobutrol (GADAVIST) injection 6 mL (6 mLs Intravenous $Given 11/28/23 2670)       LAB  Labs Ordered and Resulted from Time of ED Arrival to Time of ED Departure   BASIC METABOLIC PANEL - Abnormal       Result Value    Sodium 139      Potassium 4.4      Chloride 102      Carbon Dioxide (CO2) 30 (*)     Anion Gap 7      Urea Nitrogen 17.6      Creatinine 0.81      GFR Estimate 70      Calcium 10.2      Glucose 110 (*)    GLUCOSE BY METER - Abnormal    GLUCOSE BY METER POCT 114 (*)    INR - Normal    INR 1.03     PARTIAL THROMBOPLASTIN TIME - Normal    aPTT 31     TROPONIN T, HIGH SENSITIVITY - Normal    Troponin T, High Sensitivity 7     GLUCOSE MONITOR NURSING POCT   CBC WITH PLATELETS AND DIFFERENTIAL    WBC Count 7.7      RBC Count 4.46      Hemoglobin 14.1      Hematocrit 41.7      MCV 94      MCH 31.6      MCHC 33.8      RDW 12.4      Platelet Count 203      % Neutrophils 51      % Lymphocytes 39      % Monocytes 8      % Eosinophils 1      % Basophils 1      % Immature Granulocytes 0      NRBCs per 100 WBC 0      Absolute Neutrophils 3.9      Absolute Lymphocytes 3.0      Absolute Monocytes 0.6      Absolute Eosinophils 0.1       Absolute Basophils 0.0      Absolute Immature Granulocytes 0.0      Absolute NRBCs 0.0         RADIOLOGY    MR Brain w/o & w Contrast   Final Result   IMPRESSION:   1.  No acute intracranial process.   2.  Generalized brain atrophy and presumed microvascular ischemic changes as detailed above.      CTA Head Neck with Contrast   Final Result   IMPRESSION:    HEAD CT:   1.  Normal head CT.      HEAD CTA:    1.  No large vessel occlusion findings.      NECK CTA:   1.  No acute vascular injury. No hemodynamically significant stenosis in the neck.      2.  Case discussed with Dr. Burt at 2:04 PM.          DISCHARGE MEDS  New Prescriptions    No medications on file       Jose Farr MD  [facility] EMERGENCY DEPARTMENT  [facility] [facility] [id number] [phone]      Jose Farr MD  11/28/23 1514

## 2023-11-30 LAB
ATRIAL RATE - MUSE: 70 BPM
DIASTOLIC BLOOD PRESSURE - MUSE: NORMAL MMHG
INTERPRETATION ECG - MUSE: NORMAL
P AXIS - MUSE: 38 DEGREES
PR INTERVAL - MUSE: 204 MS
QRS DURATION - MUSE: 72 MS
QT - MUSE: 414 MS
QTC - MUSE: 447 MS
R AXIS - MUSE: 3 DEGREES
SYSTOLIC BLOOD PRESSURE - MUSE: NORMAL MMHG
T AXIS - MUSE: 37 DEGREES
VENTRICULAR RATE- MUSE: 70 BPM

## 2023-12-14 ENCOUNTER — TRANSFERRED RECORDS (OUTPATIENT)
Dept: HEALTH INFORMATION MANAGEMENT | Facility: CLINIC | Age: 88
End: 2023-12-14
Payer: COMMERCIAL

## 2024-01-08 ENCOUNTER — OFFICE VISIT (OUTPATIENT)
Dept: CARDIOLOGY | Facility: CLINIC | Age: 89
End: 2024-01-08
Payer: COMMERCIAL

## 2024-01-08 VITALS
WEIGHT: 125 LBS | DIASTOLIC BLOOD PRESSURE: 62 MMHG | HEIGHT: 61 IN | BODY MASS INDEX: 23.6 KG/M2 | RESPIRATION RATE: 16 BRPM | HEART RATE: 51 BPM | SYSTOLIC BLOOD PRESSURE: 112 MMHG

## 2024-01-08 DIAGNOSIS — I49.3 PVC'S (PREMATURE VENTRICULAR CONTRACTIONS): ICD-10-CM

## 2024-01-08 DIAGNOSIS — R01.1 SYSTOLIC MURMUR: Primary | ICD-10-CM

## 2024-01-08 DIAGNOSIS — R00.1 BRADYCARDIA: ICD-10-CM

## 2024-01-08 DIAGNOSIS — I25.10 CORONARY ARTERY CALCIFICATION: ICD-10-CM

## 2024-01-08 DIAGNOSIS — I25.10 CORONARY ARTERY DISEASE INVOLVING NATIVE CORONARY ARTERY OF NATIVE HEART WITHOUT ANGINA PECTORIS: ICD-10-CM

## 2024-01-08 DIAGNOSIS — E78.2 MIXED HYPERLIPIDEMIA: ICD-10-CM

## 2024-01-08 PROCEDURE — 99204 OFFICE O/P NEW MOD 45 MIN: CPT | Performed by: INTERNAL MEDICINE

## 2024-01-08 NOTE — LETTER
1/8/2024    Osmin Guadarrama MD  [address] [place] [address]    RE: Tayla Benz       Dear Colleague,     I had the pleasure of seeing Tayla Benz in the [facility].         [facility]   [address], [place] [address]   [email or web address]   OFFICE: [phone]            Impression and Plan     1.  Coronary artery disease.  Tayla has a history of coronary artery disease by virtue of coronary CT calcium score 11 November 2020 revealing a total Agatston calcium score is 102. A calcium score in this range places the individual in the 50-75th percentile when compared to an age and gender matched control group.    She underwent a regadenoson MRI 9 January 2020 that was unremarkable.  Continue aspirin.    2.  PVCs.  PVCs have been subjectively and objectively quiescent.    3.  Systolic murmur.  Early peaking systolic murmur is noted at right sternal border.  Suspect some degree of aortic sclerosis and perhaps some mild aortic stenosis.  Plan:  Will obtain echocardiogram for further evaluation.    4.  Dyslipidemia.  Lipid profile 14 July 2023 revealed LDL 69 mg/dL and HDL 50 mg/dL.  Continue atorvastatin.    Tentatively plan on follow-up in 1 year.    35 minutes spent reviewing prior records (including documentation, laboratory studies, cardiac testing/imaging), interview with patient along with physical exam, planning, and subsequent documentation/crafting of note).           History of Present Illness    Once again I would like to thank you again for asking me to participate in the care of your patient, Tayla Benz.  As you know, but to reiterate for my own records, Tayla Benz is a 88 year old female with history of coronary artery disease by virtue of coronary CT calcium score 11 November 2020 revealing a total Agatston calcium score is 102. A calcium score in this range places the individual in the  "50-75th percentile when compared to an age and gender matched control group.    On interview, Tayla is without cardiovascular complaint.  She specifically denies any chest pain, shortness of breath, or diminished exercise tolerance.  She reports no significant palpitations or lightheadedness.    Further review of systems is otherwise negative/noncontributory (medical record and 13 point review of systems reviewed as well and pertinent positives noted).         Cardiac Diagnostics        Regadenoson MRI 9 January 2020:  Lexiscan stress cardiac MRI is negative for inducible myocardial ischemia.  Lexiscan stress ECG is negative for inducible myocardial ischemia. ECG showed frequent PVCs bigeminy pattern.  No previous myocardial infarction is identified.  Normal left ventricular size, wall thickness and function. The calculated left ventricular ejection fraction is 55%. No myocardial scar is identified.  Normal right ventricular size and function.  No obvious valvular disease.    Twelve-lead ECG (personally reviewed) 28 November 2023: Sinus rhythm.  Normal ECG.           Physical Examination       /62 (BP Location: Right arm, Patient Position: Sitting, Cuff Size: Adult Regular)   Pulse 51   Resp 16   Ht 1.556 m (5' 1.25\")   Wt 56.7 kg (125 lb)   BMI 23.43 kg/m          Wt Readings from Last 3 Encounters:   01/08/24 56.7 kg (125 lb)   11/28/23 57.6 kg (127 lb)   09/08/22 56.7 kg (125 lb)       The patient is alert and oriented times three. Sclerae are anicteric. Mucosal membranes are moist. Jugular venous pressure is normal. No significant adenopathy/thyromegally appreciated. Lungs are clear with good expansion. On cardiovascular exam, the patient has a regular S1 and S2. Abdomen is soft and non-tender. Extremities reveal no clubbing, cyanosis, or edema.         Family History/Social History/Risk Factors   Patient does not smoke.  Family history reviewed, and family history includes Coronary Artery Disease " in her sister.          Medical History  Surgical History Family History Social History   Past Medical History:   Diagnosis Date    Acute bronchitis     Root esophagus     BPPV (benign paroxysmal positional vertigo)     Carpal tunnel syndrome     Congenital hip dysplasia     Dysphasia     GERD (gastroesophageal reflux disease)     Nepali measles     History of transfusion     hip surgery years ago    Hypothyroidism     Murmur     PVC (premature ventricular contraction)     Shingles      Past Surgical History:   Procedure Laterality Date    CHOLECYSTECTOMY      ENDOMETRIAL BIOPSY  1995    for post menopausal bleeding    FOOT SURGERY Right     HIP SURGERY Right     X4    AR ESOPHAGOGASTRODUODENOSCOPY TRANSORAL DIAGNOSTIC N/A 3/6/2018    Procedure: ESOPHAGOGASTRODUODENOSCOPY WITH DILATATION;  Surgeon: Eleno Moyer MD;  Location: [facility] OR;  Service: Gastroenterology    AR ESOPHAGOGASTRODUODENOSCOPY TRANSORAL DIAGNOSTIC N/A 4/3/2019    Procedure: UPPER ENDOSCOPY WITH DILATION;  Surgeon: Eleno Moyer MD;  Location: [facility];  Service: Gastroenterology     Family History   Problem Relation Age of Onset    Coronary Artery Disease Sister         Social History     Socioeconomic History    Marital status:      Spouse name: Not on file    Number of children: Not on file    Years of education: Not on file    Highest education level: Not on file   Occupational History    Not on file   Tobacco Use    Smoking status: Never    Smokeless tobacco: Never   Substance and Sexual Activity    Alcohol use: Yes     Comment: Alcoholic Drinks/day: very rare    Drug use: No    Sexual activity: Not on file   Other Topics Concern    Not on file   Social History Narrative    Not on file     Social Determinants of Health     Financial Resource Strain: Not on file   Food Insecurity: Not on file   Transportation Needs: Not on file   Physical Activity: Not on file   Stress: Not on file   Social Connections: Not on  file   Interpersonal Safety: Not on file   Housing Stability: Not on file           Medications  Allergies   Current Outpatient Medications   Medication Sig Dispense Refill    atorvastatin (LIPITOR) 20 MG tablet TAKE 1 TABLET BY MOUTH AT BEDTIME 90 tablet 3    b complex vitamins tablet [B COMPLEX VITAMINS TABLET]       coenzyme Q10 (CO Q-10) 100 mg capsule [COENZYME Q10 (CO Q-10) 100 MG CAPSULE] Take 1 capsule (100 mg total) by mouth 2 (two) times a day.  0    fluocinonide (LIDEX) 0.05 % cream [FLUOCINONIDE (LIDEX) 0.05 % CREAM] Apply 1 application topically as needed.       levothyroxine (SYNTHROID, LEVOTHROID) 25 MCG tablet [LEVOTHYROXINE (SYNTHROID, LEVOTHROID) 25 MCG TABLET] Take 25 mcg by mouth daily.      loratadine (CLARITIN) 10 mg tablet [LORATADINE (CLARITIN) 10 MG TABLET] Take 10 mg by mouth daily as needed.       multivitamin with minerals tablet [MULTIVITAMIN WITH MINERALS TABLET] Take 1 tablet by mouth daily.      omega 3-dha-epa-fish oil 1,000 mg (120 mg-180 mg) cap [OMEGA 3-DHA-EPA-FISH OIL 1,000 MG (120 MG-180 MG) CAP]       omeprazole (PRILOSEC) 20 MG DR capsule Take 1 capsule (20 mg) by mouth 2 times daily 20 capsule 0    aspirin 81 MG EC tablet [ASPIRIN 81 MG EC TABLET] Take 1 tablet (81 mg total) by mouth daily. (Patient not taking: Reported on 7/20/2022)  0    cod liver oil cap [COD LIVER OIL CAP] Take 1 capsule by mouth daily. (Patient not taking: Reported on 7/20/2022)      famotidine (PEPCID) 10 MG tablet Take 1 tablet (10 mg) by mouth 2 times daily (Patient not taking: Reported on 1/8/2024) 20 tablet 0    LUTEIN ORAL [LUTEIN ORAL] as needed.  (Patient not taking: Reported on 7/20/2022)      meclizine (ANTIVERT) 25 mg tablet [MECLIZINE (ANTIVERT) 25 MG TABLET] Take 1 tablet (25 mg total) by mouth 3 (three) times a day as needed. (Patient not taking: Reported on 1/8/2024) 30 tablet 0    OMEGA-3/DHA/EPA/FISH OIL (FISH OIL-OMEGA-3 FATTY ACIDS) 300-1,000 mg capsule [OMEGA-3/DHA/EPA/FISH OIL (FISH  "OIL-OMEGA-3 FATTY ACIDS) 300-1,000 MG CAPSULE] Take 2 g by mouth daily. (Patient not taking: Reported on 7/20/2022)      psyllium with dextrose (METAMUCIL JAR) powder [PSYLLIUM WITH DEXTROSE (METAMUCIL JAR) POWDER] Take by mouth daily. (Patient not taking: Reported on 7/20/2022)      ranitidine (ZANTAC) 150 MG capsule [RANITIDINE (ZANTAC) 150 MG CAPSULE] Take 150 mg by mouth 2 (two) times a day. (Patient not taking: Reported on 7/20/2022)       No Known Allergies       Lab Results    Chemistry/lipid CBC Cardiac Enzymes/BNP/TSH/INR   Recent Labs   Lab Test 07/14/23  1021   CHOL 143   HDL 50   LDL 69   TRIG 121     Recent Labs   Lab Test 07/14/23  1021 07/20/22  1041 09/15/21  0813   LDL 69 69 76     Recent Labs   Lab Test 11/28/23  1402      POTASSIUM 4.4   CHLORIDE 102   CO2 30*   *   BUN 17.6   CR 0.81   GFRESTIMATED 70   BJ 10.2     Recent Labs   Lab Test 11/28/23  1402 07/14/23  1021 06/26/23  1337   CR 0.81 0.81 0.81     No results for input(s): \"A1C\" in the last 84685 hours.       Recent Labs   Lab Test 11/28/23  1402   WBC 7.7   HGB 14.1   HCT 41.7   MCV 94        Recent Labs   Lab Test 11/28/23  1402 09/08/22  1255   HGB 14.1 15.0    Recent Labs   Lab Test 09/08/22  1255   TROPONINI 0.02     Recent Labs   Lab Test 09/08/22  1255   BNP 20     Recent Labs   Lab Test 07/14/23  1021   TSH 1.86     Recent Labs   Lab Test 11/28/23  1402   INR 1.03          Medications  Allergies   Current Outpatient Medications   Medication Sig Dispense Refill    atorvastatin (LIPITOR) 20 MG tablet TAKE 1 TABLET BY MOUTH AT BEDTIME 90 tablet 3    b complex vitamins tablet [B COMPLEX VITAMINS TABLET]       coenzyme Q10 (CO Q-10) 100 mg capsule [COENZYME Q10 (CO Q-10) 100 MG CAPSULE] Take 1 capsule (100 mg total) by mouth 2 (two) times a day.  0    fluocinonide (LIDEX) 0.05 % cream [FLUOCINONIDE (LIDEX) 0.05 % CREAM] Apply 1 application topically as needed.       levothyroxine (SYNTHROID, LEVOTHROID) 25 MCG " tablet [LEVOTHYROXINE (SYNTHROID, LEVOTHROID) 25 MCG TABLET] Take 25 mcg by mouth daily.      loratadine (CLARITIN) 10 mg tablet [LORATADINE (CLARITIN) 10 MG TABLET] Take 10 mg by mouth daily as needed.       multivitamin with minerals tablet [MULTIVITAMIN WITH MINERALS TABLET] Take 1 tablet by mouth daily.      omega 3-dha-epa-fish oil 1,000 mg (120 mg-180 mg) cap [OMEGA 3-DHA-EPA-FISH OIL 1,000 MG (120 MG-180 MG) CAP]       omeprazole (PRILOSEC) 20 MG DR capsule Take 1 capsule (20 mg) by mouth 2 times daily 20 capsule 0    aspirin 81 MG EC tablet [ASPIRIN 81 MG EC TABLET] Take 1 tablet (81 mg total) by mouth daily. (Patient not taking: Reported on 7/20/2022)  0    cod liver oil cap [COD LIVER OIL CAP] Take 1 capsule by mouth daily. (Patient not taking: Reported on 7/20/2022)      famotidine (PEPCID) 10 MG tablet Take 1 tablet (10 mg) by mouth 2 times daily (Patient not taking: Reported on 1/8/2024) 20 tablet 0    LUTEIN ORAL [LUTEIN ORAL] as needed.  (Patient not taking: Reported on 7/20/2022)      meclizine (ANTIVERT) 25 mg tablet [MECLIZINE (ANTIVERT) 25 MG TABLET] Take 1 tablet (25 mg total) by mouth 3 (three) times a day as needed. (Patient not taking: Reported on 1/8/2024) 30 tablet 0    OMEGA-3/DHA/EPA/FISH OIL (FISH OIL-OMEGA-3 FATTY ACIDS) 300-1,000 mg capsule [OMEGA-3/DHA/EPA/FISH OIL (FISH OIL-OMEGA-3 FATTY ACIDS) 300-1,000 MG CAPSULE] Take 2 g by mouth daily. (Patient not taking: Reported on 7/20/2022)      psyllium with dextrose (METAMUCIL JAR) powder [PSYLLIUM WITH DEXTROSE (METAMUCIL JAR) POWDER] Take by mouth daily. (Patient not taking: Reported on 7/20/2022)      ranitidine (ZANTAC) 150 MG capsule [RANITIDINE (ZANTAC) 150 MG CAPSULE] Take 150 mg by mouth 2 (two) times a day. (Patient not taking: Reported on 7/20/2022)        No Known Allergies       Lab Results   Lab Results   Component Value Date     11/28/2023    CO2 30 11/28/2023    CO2 33 09/08/2022    BUN 17.6 11/28/2023    BUN 20  09/08/2022     Lab Results   Component Value Date    WBC 7.7 11/28/2023    HGB 14.1 11/28/2023    HCT 41.7 11/28/2023    MCV 94 11/28/2023     11/28/2023     Lab Results   Component Value Date    CHOL 143 07/14/2023    TRIG 121 07/14/2023    HDL 50 07/14/2023     Lab Results   Component Value Date    INR 1.03 11/28/2023     Lab Results   Component Value Date    BNP 20 09/08/2022     Lab Results   Component Value Date    TROPONINI 0.02 09/08/2022     Lab Results   Component Value Date    TSH 1.86 07/14/2023    TSH 2.32 09/15/2021                      Thank you for allowing me to participate in the care of your patient.      Sincerely,     Gabriel Conway MD     [facility]  cc:   Sarah Banks MD  [address]  [place] [address]

## 2024-01-08 NOTE — PROGRESS NOTES
[facility] [address], [place] [address]   [email or web address]   OFFICE: [phone]            Impression and Plan     1.  Coronary artery disease.  Tayla has a history of coronary artery disease by virtue of coronary CT calcium score 11 November 2020 revealing a total Agatston calcium score is 102. A calcium score in this range places the individual in the 50-75th percentile when compared to an age and gender matched control group.    She underwent a regadenoson MRI 9 January 2020 that was unremarkable.  Continue aspirin.    2.  PVCs.  PVCs have been subjectively and objectively quiescent.    3.  Systolic murmur.  Early peaking systolic murmur is noted at right sternal border.  Suspect some degree of aortic sclerosis and perhaps some mild aortic stenosis.  Plan:  Will obtain echocardiogram for further evaluation.    4.  Dyslipidemia.  Lipid profile 14 July 2023 revealed LDL 69 mg/dL and HDL 50 mg/dL.  Continue atorvastatin.    Tentatively plan on follow-up in 1 year.    35 minutes spent reviewing prior records (including documentation, laboratory studies, cardiac testing/imaging), interview with patient along with physical exam, planning, and subsequent documentation/crafting of note).           History of Present Illness    Once again I would like to thank you again for asking me to participate in the care of your patient, Tayla Benz.  As you know, but to reiterate for my own records, Tayla Benz is a 88 year old female with history of coronary artery disease by virtue of coronary CT calcium score 11 November 2020 revealing a total Agatston calcium score is 102. A calcium score in this range places the individual in the 50-75th percentile when compared to an age and gender matched control group.    On interview, Tayla is without cardiovascular complaint.  She specifically denies any chest pain, shortness of breath, or diminished  "exercise tolerance.  She reports no significant palpitations or lightheadedness.    Further review of systems is otherwise negative/noncontributory (medical record and 13 point review of systems reviewed as well and pertinent positives noted).         Cardiac Diagnostics        Regadenoson MRI 9 January 2020:  Lexiscan stress cardiac MRI is negative for inducible myocardial ischemia.  Lexiscan stress ECG is negative for inducible myocardial ischemia. ECG showed frequent PVCs bigeminy pattern.  No previous myocardial infarction is identified.  Normal left ventricular size, wall thickness and function. The calculated left ventricular ejection fraction is 55%. No myocardial scar is identified.  Normal right ventricular size and function.  No obvious valvular disease.    Twelve-lead ECG (personally reviewed) 28 November 2023: Sinus rhythm.  Normal ECG.           Physical Examination       /62 (BP Location: Right arm, Patient Position: Sitting, Cuff Size: Adult Regular)   Pulse 51   Resp 16   Ht 1.556 m (5' 1.25\")   Wt 56.7 kg (125 lb)   BMI 23.43 kg/m          Wt Readings from Last 3 Encounters:   01/08/24 56.7 kg (125 lb)   11/28/23 57.6 kg (127 lb)   09/08/22 56.7 kg (125 lb)       The patient is alert and oriented times three. Sclerae are anicteric. Mucosal membranes are moist. Jugular venous pressure is normal. No significant adenopathy/thyromegally appreciated. Lungs are clear with good expansion. On cardiovascular exam, the patient has a regular S1 and S2. Abdomen is soft and non-tender. Extremities reveal no clubbing, cyanosis, or edema.         Family History/Social History/Risk Factors   Patient does not smoke.  Family history reviewed, and family history includes Coronary Artery Disease in her sister.          Medical History  Surgical History Family History Social History   Past Medical History:   Diagnosis Date    Acute bronchitis     Root esophagus     BPPV (benign paroxysmal positional " vertigo)     Carpal tunnel syndrome     Congenital hip dysplasia     Dysphasia     GERD (gastroesophageal reflux disease)     [place] measles     History of transfusion     hip surgery years ago    Hypothyroidism     Murmur     PVC (premature ventricular contraction)     Shingles      Past Surgical History:   Procedure Laterality Date    CHOLECYSTECTOMY      ENDOMETRIAL BIOPSY  1995    for post menopausal bleeding    FOOT SURGERY Right     HIP SURGERY Right     X4    HI ESOPHAGOGASTRODUODENOSCOPY TRANSORAL DIAGNOSTIC N/A 3/6/2018    Procedure: ESOPHAGOGASTRODUODENOSCOPY WITH DILATATION;  Surgeon: Eleno Moyer MD;  Location: [facility] OR;  Service: Gastroenterology    HI ESOPHAGOGASTRODUODENOSCOPY TRANSORAL DIAGNOSTIC N/A 4/3/2019    Procedure: UPPER ENDOSCOPY WITH DILATION;  Surgeon: Eleno Moyer MD;  Location: [facility];  Service: Gastroenterology     Family History   Problem Relation Age of Onset    Coronary Artery Disease Sister         Social History     Socioeconomic History    Marital status:      Spouse name: Not on file    Number of children: Not on file    Years of education: Not on file    Highest education level: Not on file   Occupational History    Not on file   Tobacco Use    Smoking status: Never    Smokeless tobacco: Never   Substance and Sexual Activity    Alcohol use: Yes     Comment: Alcoholic Drinks/day: very rare    Drug use: No    Sexual activity: Not on file   Other Topics Concern    Not on file   Social History Narrative    Not on file     Social Determinants of Health     Financial Resource Strain: Not on file   Food Insecurity: Not on file   Transportation Needs: Not on file   Physical Activity: Not on file   Stress: Not on file   Social Connections: Not on file   Interpersonal Safety: Not on file   Housing Stability: Not on file           Medications  Allergies   Current Outpatient Medications   Medication Sig Dispense Refill    atorvastatin (LIPITOR) 20 MG tablet  TAKE 1 TABLET BY MOUTH AT BEDTIME 90 tablet 3    b complex vitamins tablet [B COMPLEX VITAMINS TABLET]       coenzyme Q10 (CO Q-10) 100 mg capsule [COENZYME Q10 (CO Q-10) 100 MG CAPSULE] Take 1 capsule (100 mg total) by mouth 2 (two) times a day.  0    fluocinonide (LIDEX) 0.05 % cream [FLUOCINONIDE (LIDEX) 0.05 % CREAM] Apply 1 application topically as needed.       levothyroxine (SYNTHROID, LEVOTHROID) 25 MCG tablet [LEVOTHYROXINE (SYNTHROID, LEVOTHROID) 25 MCG TABLET] Take 25 mcg by mouth daily.      loratadine (CLARITIN) 10 mg tablet [LORATADINE (CLARITIN) 10 MG TABLET] Take 10 mg by mouth daily as needed.       multivitamin with minerals tablet [MULTIVITAMIN WITH MINERALS TABLET] Take 1 tablet by mouth daily.      omega 3-dha-epa-fish oil 1,000 mg (120 mg-180 mg) cap [OMEGA 3-DHA-EPA-FISH OIL 1,000 MG (120 MG-180 MG) CAP]       omeprazole (PRILOSEC) 20 MG DR capsule Take 1 capsule (20 mg) by mouth 2 times daily 20 capsule 0    aspirin 81 MG EC tablet [ASPIRIN 81 MG EC TABLET] Take 1 tablet (81 mg total) by mouth daily. (Patient not taking: Reported on 7/20/2022)  0    cod liver oil cap [COD LIVER OIL CAP] Take 1 capsule by mouth daily. (Patient not taking: Reported on 7/20/2022)      famotidine (PEPCID) 10 MG tablet Take 1 tablet (10 mg) by mouth 2 times daily (Patient not taking: Reported on 1/8/2024) 20 tablet 0    LUTEIN ORAL [LUTEIN ORAL] as needed.  (Patient not taking: Reported on 7/20/2022)      meclizine (ANTIVERT) 25 mg tablet [MECLIZINE (ANTIVERT) 25 MG TABLET] Take 1 tablet (25 mg total) by mouth 3 (three) times a day as needed. (Patient not taking: Reported on 1/8/2024) 30 tablet 0    OMEGA-3/DHA/EPA/FISH OIL (FISH OIL-OMEGA-3 FATTY ACIDS) 300-1,000 mg capsule [OMEGA-3/DHA/EPA/FISH OIL (FISH OIL-OMEGA-3 FATTY ACIDS) 300-1,000 MG CAPSULE] Take 2 g by mouth daily. (Patient not taking: Reported on 7/20/2022)      psyllium with dextrose (METAMUCIL JAR) powder [PSYLLIUM WITH DEXTROSE (METAMUCIL JAR)  "POWDER] Take by mouth daily. (Patient not taking: Reported on 7/20/2022)      ranitidine (ZANTAC) 150 MG capsule [RANITIDINE (ZANTAC) 150 MG CAPSULE] Take 150 mg by mouth 2 (two) times a day. (Patient not taking: Reported on 7/20/2022)       No Known Allergies       Lab Results    Chemistry/lipid CBC Cardiac Enzymes/BNP/TSH/INR   Recent Labs   Lab Test 07/14/23  1021   CHOL 143   HDL 50   LDL 69   TRIG 121     Recent Labs   Lab Test 07/14/23  1021 07/20/22  1041 09/15/21  0813   LDL 69 69 76     Recent Labs   Lab Test 11/28/23  1402      POTASSIUM 4.4   CHLORIDE 102   CO2 30*   *   BUN 17.6   CR 0.81   GFRESTIMATED 70   BJ 10.2     Recent Labs   Lab Test 11/28/23  1402 07/14/23  1021 06/26/23  1337   CR 0.81 0.81 0.81     No results for input(s): \"A1C\" in the last 22809 hours.       Recent Labs   Lab Test 11/28/23  1402   WBC 7.7   HGB 14.1   HCT 41.7   MCV 94        Recent Labs   Lab Test 11/28/23  1402 09/08/22  1255   HGB 14.1 15.0    Recent Labs   Lab Test 09/08/22  1255   TROPONINI 0.02     Recent Labs   Lab Test 09/08/22  1255   BNP 20     Recent Labs   Lab Test 07/14/23  1021   TSH 1.86     Recent Labs   Lab Test 11/28/23  1402   INR 1.03          Medications  Allergies   Current Outpatient Medications   Medication Sig Dispense Refill    atorvastatin (LIPITOR) 20 MG tablet TAKE 1 TABLET BY MOUTH AT BEDTIME 90 tablet 3    b complex vitamins tablet [B COMPLEX VITAMINS TABLET]       coenzyme Q10 (CO Q-10) 100 mg capsule [COENZYME Q10 (CO Q-10) 100 MG CAPSULE] Take 1 capsule (100 mg total) by mouth 2 (two) times a day.  0    fluocinonide (LIDEX) 0.05 % cream [FLUOCINONIDE (LIDEX) 0.05 % CREAM] Apply 1 application topically as needed.       levothyroxine (SYNTHROID, LEVOTHROID) 25 MCG tablet [LEVOTHYROXINE (SYNTHROID, LEVOTHROID) 25 MCG TABLET] Take 25 mcg by mouth daily.      loratadine (CLARITIN) 10 mg tablet [LORATADINE (CLARITIN) 10 MG TABLET] Take 10 mg by mouth daily as needed.       " multivitamin with minerals tablet [MULTIVITAMIN WITH MINERALS TABLET] Take 1 tablet by mouth daily.      omega 3-dha-epa-fish oil 1,000 mg (120 mg-180 mg) cap [OMEGA 3-DHA-EPA-FISH OIL 1,000 MG (120 MG-180 MG) CAP]       omeprazole (PRILOSEC) 20 MG DR capsule Take 1 capsule (20 mg) by mouth 2 times daily 20 capsule 0    aspirin 81 MG EC tablet [ASPIRIN 81 MG EC TABLET] Take 1 tablet (81 mg total) by mouth daily. (Patient not taking: Reported on 7/20/2022)  0    cod liver oil cap [COD LIVER OIL CAP] Take 1 capsule by mouth daily. (Patient not taking: Reported on 7/20/2022)      famotidine (PEPCID) 10 MG tablet Take 1 tablet (10 mg) by mouth 2 times daily (Patient not taking: Reported on 1/8/2024) 20 tablet 0    LUTEIN ORAL [LUTEIN ORAL] as needed.  (Patient not taking: Reported on 7/20/2022)      meclizine (ANTIVERT) 25 mg tablet [MECLIZINE (ANTIVERT) 25 MG TABLET] Take 1 tablet (25 mg total) by mouth 3 (three) times a day as needed. (Patient not taking: Reported on 1/8/2024) 30 tablet 0    OMEGA-3/DHA/EPA/FISH OIL (FISH OIL-OMEGA-3 FATTY ACIDS) 300-1,000 mg capsule [OMEGA-3/DHA/EPA/FISH OIL (FISH OIL-OMEGA-3 FATTY ACIDS) 300-1,000 MG CAPSULE] Take 2 g by mouth daily. (Patient not taking: Reported on 7/20/2022)      psyllium with dextrose (METAMUCIL JAR) powder [PSYLLIUM WITH DEXTROSE (METAMUCIL JAR) POWDER] Take by mouth daily. (Patient not taking: Reported on 7/20/2022)      ranitidine (ZANTAC) 150 MG capsule [RANITIDINE (ZANTAC) 150 MG CAPSULE] Take 150 mg by mouth 2 (two) times a day. (Patient not taking: Reported on 7/20/2022)        No Known Allergies       Lab Results   Lab Results   Component Value Date     11/28/2023    CO2 30 11/28/2023    CO2 33 09/08/2022    BUN 17.6 11/28/2023    BUN 20 09/08/2022     Lab Results   Component Value Date    WBC 7.7 11/28/2023    HGB 14.1 11/28/2023    HCT 41.7 11/28/2023    MCV 94 11/28/2023     11/28/2023     Lab Results   Component Value Date    CHOL 143  07/14/2023    TRIG 121 07/14/2023    HDL 50 07/14/2023     Lab Results   Component Value Date    INR 1.03 11/28/2023     Lab Results   Component Value Date    BNP 20 09/08/2022     Lab Results   Component Value Date    TROPONINI 0.02 09/08/2022     Lab Results   Component Value Date    TSH 1.86 07/14/2023    TSH 2.32 09/15/2021

## 2024-01-23 ENCOUNTER — HOSPITAL ENCOUNTER (OUTPATIENT)
Dept: CARDIOLOGY | Facility: HOSPITAL | Age: 89
Discharge: HOME | End: 2024-01-23
Attending: INTERNAL MEDICINE | Admitting: INTERNAL MEDICINE
Payer: COMMERCIAL

## 2024-01-23 DIAGNOSIS — R01.1 SYSTOLIC MURMUR: ICD-10-CM

## 2024-01-23 DIAGNOSIS — I49.3 PVC'S (PREMATURE VENTRICULAR CONTRACTIONS): ICD-10-CM

## 2024-01-23 LAB — LVEF ECHO: NORMAL

## 2024-01-23 PROCEDURE — 93306 TTE W/DOPPLER COMPLETE: CPT | Mod: 26 | Performed by: INTERNAL MEDICINE

## 2024-01-23 PROCEDURE — 93306 TTE W/DOPPLER COMPLETE: CPT

## 2024-03-22 ENCOUNTER — OFFICE VISIT (OUTPATIENT)
Dept: NEUROLOGY | Facility: CLINIC | Age: 89
End: 2024-03-22
Attending: FAMILY MEDICINE
Payer: COMMERCIAL

## 2024-03-22 VITALS
DIASTOLIC BLOOD PRESSURE: 58 MMHG | RESPIRATION RATE: 16 BRPM | WEIGHT: 125 LBS | HEART RATE: 84 BPM | SYSTOLIC BLOOD PRESSURE: 102 MMHG | BODY MASS INDEX: 23.43 KG/M2

## 2024-03-22 DIAGNOSIS — R41.89 SPELL OF ALTERED COGNITION: Primary | ICD-10-CM

## 2024-03-22 DIAGNOSIS — G45.9 TIA (TRANSIENT ISCHEMIC ATTACK): ICD-10-CM

## 2024-03-22 DIAGNOSIS — R47.01 APHASIA: ICD-10-CM

## 2024-03-22 DIAGNOSIS — G43.109 COMPLICATED MIGRAINE: ICD-10-CM

## 2024-03-22 DIAGNOSIS — R73.9 HYPERGLYCEMIA: ICD-10-CM

## 2024-03-22 DIAGNOSIS — R41.3 TRANSIENT AMNESIA: ICD-10-CM

## 2024-03-22 PROCEDURE — 99205 OFFICE O/P NEW HI 60 MIN: CPT | Performed by: PSYCHIATRY & NEUROLOGY

## 2024-03-22 RX ORDER — LEVOTHYROXINE SODIUM 50 UG/1
50 TABLET ORAL DAILY
COMMUNITY
Start: 2024-03-13

## 2024-03-22 NOTE — PROGRESS NOTES
NEUROLOGY OUTPATIENT CONSULT NOTE   Mar 22, 2024     CHIEF COMPLAINT/REASON FOR VISIT/REASON FOR CONSULT  Patient presents with:  Consult    REASON FOR CONSULTATION-spell of altered cognition    REFERRAL SOURCE  Dr. Jose Farr   Dr. Jose Farr    HISTORY OF PRESENT ILLNESS  Tayla Benz is a 88 year old female seen today for evaluation of a spell of altered cognition.  The spell happened in November.  She was driving.  She did have breakfast that morning.  She often feels hypoglycemic around noon and she had a piece of candy to see if that would help.  Her glucose was normal when she presented to the emergency room.    Her symptoms include not remembering where she was going.  She had complete memory loss.  She felt confused.  Her vision was askew.  Did have a headache.  Headache was global and more of a pressure.  No photophobia or phonophobia.  She was trying to  a package on her porch and tumbled onto the floor.  She feels that she was weak all over.  History regarding lightheadedness is not completely clear.  Was not taking any aspirin.  No chest pain or palpitation.  Symptoms lasted for few hours and then were back to normal.  She had an MRI done in the emergency room that was negative for acute stroke.  She reports no triggers.  Was getting good sleep the night before.  No stress.  No recurrence of her symptoms.  Has not had any headaches or any symptoms like this in the past.    Her mother had a comatose spell in the past and was put on phenobarbital.  She was told that this was related to migraine.    Previous history is reviewed and this is unchanged.    PAST MEDICAL/SURGICAL HISTORY  Past Medical History:   Diagnosis Date    Acute bronchitis     Root esophagus     BPPV (benign paroxysmal positional vertigo)     Carpal tunnel syndrome     Congenital hip dysplasia     Dysphasia     GERD (gastroesophageal reflux disease)     [place] measles     History of transfusion     hip  surgery years ago    Hypothyroidism     Murmur     PVC (premature ventricular contraction)     Shingles      Patient Active Problem List   Diagnosis    PVC's (premature ventricular contractions)    Bradycardia   Significant for high cholesterol, arthritis.  Also has a heart murmur.    FAMILY HISTORY  Family History   Problem Relation Age of Onset    Coronary Artery Disease Sister    Mother with a comatose spell thought to be migraine.  Was put on seizure medication.  Brother with memory loss    SOCIAL HISTORY  Social History     Tobacco Use    Smoking status: Never    Smokeless tobacco: Never   Substance Use Topics    Alcohol use: Yes     Comment: Alcoholic Drinks/day: very rare    Drug use: No       SYSTEMS REVIEW  Twelve-system ROS was done and other than the HPI this was negative/positive for arm and leg pain, joint pain, numbness/tingling, difficulty walking, balance/coordination problems, vision symptoms.      MEDICATIONS  atorvastatin (LIPITOR) 20 MG tablet, TAKE 1 TABLET BY MOUTH AT BEDTIME  b complex vitamins tablet, [B COMPLEX VITAMINS TABLET]   coenzyme Q10 (CO Q-10) 100 mg capsule, [COENZYME Q10 (CO Q-10) 100 MG CAPSULE] Take 1 capsule (100 mg total) by mouth 2 (two) times a day.  levothyroxine (SYNTHROID/LEVOTHROID) 50 MCG tablet, Take 50 mcg by mouth daily  loratadine (CLARITIN) 10 mg tablet, [LORATADINE (CLARITIN) 10 MG TABLET] Take 10 mg by mouth daily as needed.   multivitamin with minerals tablet, [MULTIVITAMIN WITH MINERALS TABLET] Take 1 tablet by mouth daily.  omega 3-dha-epa-fish oil 1,000 mg (120 mg-180 mg) cap, [OMEGA 3-DHA-EPA-FISH OIL 1,000 MG (120 MG-180 MG) CAP]   omeprazole (PRILOSEC) 20 MG DR capsule, Take 1 capsule (20 mg) by mouth 2 times daily  aspirin 81 MG EC tablet, [ASPIRIN 81 MG EC TABLET] Take 1 tablet (81 mg total) by mouth daily. (Patient not taking: Reported on 7/20/2022)  cod liver oil cap, [COD LIVER OIL CAP] Take 1 capsule by mouth daily. (Patient not taking: Reported on  7/20/2022)  famotidine (PEPCID) 10 MG tablet, Take 1 tablet (10 mg) by mouth 2 times daily (Patient not taking: Reported on 1/8/2024)  fluocinonide (LIDEX) 0.05 % cream, [FLUOCINONIDE (LIDEX) 0.05 % CREAM] Apply 1 application topically as needed.  (Patient not taking: Reported on 3/22/2024)  LUTEIN ORAL, [LUTEIN ORAL] as needed.  (Patient not taking: Reported on 7/20/2022)  meclizine (ANTIVERT) 25 mg tablet, [MECLIZINE (ANTIVERT) 25 MG TABLET] Take 1 tablet (25 mg total) by mouth 3 (three) times a day as needed. (Patient not taking: Reported on 1/8/2024)  OMEGA-3/DHA/EPA/FISH OIL (FISH OIL-OMEGA-3 FATTY ACIDS) 300-1,000 mg capsule, [OMEGA-3/DHA/EPA/FISH OIL (FISH OIL-OMEGA-3 FATTY ACIDS) 300-1,000 MG CAPSULE] Take 2 g by mouth daily. (Patient not taking: Reported on 7/20/2022)  psyllium with dextrose (METAMUCIL JAR) powder, [PSYLLIUM WITH DEXTROSE (METAMUCIL JAR) POWDER] Take by mouth daily. (Patient not taking: Reported on 7/20/2022)  ranitidine (ZANTAC) 150 MG capsule, [RANITIDINE (ZANTAC) 150 MG CAPSULE] Take 150 mg by mouth 2 (two) times a day. (Patient not taking: Reported on 7/20/2022)    No current facility-administered medications on file prior to visit.       PHYSICAL EXAMINATION  VITALS: /58   Pulse 84   Resp 16   Wt 56.7 kg (125 lb)   BMI 23.43 kg/m    GENERAL: Healthy appearing, alert, no acute distress, normal habitus.  CARDIOVASCULAR: Extremities warm and well perfused. Pulses present.   NEUROLOGICAL:  Patient is awake and oriented to self, place and time.  Attention span is normal.  Memory is grossly intact.  Language is fluent and follows commands appropriately.  Appropriate fund of knowledge. Cranial nerves 2-12 are intact. There is no pronator drift.  Motor exam shows 5/5 strength in all extremities.  Tone is symmetric bilaterally in upper and lower extremities.  Reflexes are symmetric and 1+ in upper extremities and lower extremities. Sensory exam is grossly intact to light touch, pin  prick and vibration.  Finger to nose and heel to shin is without dysmetria.  Romberg is negative.  Gait is normal and the patient is able to do tandem walk and walk on toes and heels.      DIAGNOSTICS  MRI-images reviewed.  IMPRESSION:  1.  No acute intracranial process.  2.  Generalized brain atrophy and presumed microvascular ischemic changes as detailed above.    CTA  HEAD CT:  1.  Normal head CT.     HEAD CTA:   1.  No large vessel occlusion findings.     NECK CTA:  1.  No acute vascular injury. No hemodynamically significant stenosis in the neck    ECHO  1.Left ventricular size, wall motion and function are normal. The ejection  fraction is > 65%.  2.Mildly decreased right ventricular systolic function  3.There is mild to moderate (1-2+) tricuspid regurgitation.  4.No hemodynamically significant valvular abnormalities on 2D or color flow  imaging.  There is no comparison study available.      RELEVANT LABS  Component      Latest Ref Rn 7/14/2023  10:21 AM 11/28/2023  2:02 PM   WBC      4.0 - 11.0 10e3/uL  7.7    RBC Count      3.80 - 5.20 10e6/uL  4.46    Hemoglobin      11.7 - 15.7 g/dL  14.1    Hematocrit      35.0 - 47.0 %  41.7    MCV      78 - 100 fL  94    MCH      26.5 - 33.0 pg  31.6    MCHC      31.5 - 36.5 g/dL  33.8    RDW      10.0 - 15.0 %  12.4    Platelet Count      150 - 450 10e3/uL  203    % Neutrophils      %  51    % Lymphocytes      %  39    % Monocytes      %  8    % Eosinophils      %  1    % Basophils      %  1    % Immature Granulocytes      %  0    NRBCs per 100 WBC      <1 /100  0    Absolute Neutrophils      1.6 - 8.3 10e3/uL  3.9    Absolute Lymphocytes      0.8 - 5.3 10e3/uL  3.0    Absolute Monocytes      0.0 - 1.3 10e3/uL  0.6    Absolute Eosinophils      0.0 - 0.7 10e3/uL  0.1    Absolute Basophils      0.0 - 0.2 10e3/uL  0.0    Absolute Immature Granulocytes      <=0.4 10e3/uL  0.0    Absolute NRBCs      10e3/uL  0.0    Sodium      135 - 145 mmol/L 141  139    Potassium       3.4 - 5.3 mmol/L 4.0  4.4    Chloride      98 - 107 mmol/L 103  102    Carbon Dioxide (CO2)      22 - 29 mmol/L 28  30 (H)    Anion Gap      7 - 15 mmol/L 10  7    Urea Nitrogen      8.0 - 23.0 mg/dL 14.1  17.6    Creatinine      0.51 - 0.95 mg/dL 0.81  0.81    Calcium      8.8 - 10.2 mg/dL 10.3 (H)  10.2    Glucose      70 - 99 mg/dL 113 (H)  110 (H)    Alkaline Phosphatase      35 - 104 U/L 114 (H)     AST      0 - 45 U/L 25     ALT      0 - 50 U/L 20     Protein Total      6.4 - 8.3 g/dL 6.5     Albumin      3.5 - 5.2 g/dL 4.4     Bilirubin Total      <=1.2 mg/dL 0.5     GFR Estimate      >60 mL/min/1.73m2 70  70    TSH      0.30 - 4.20 uIU/mL 1.86        Legend:  (H) High    Component      Latest Ref Rng 7/14/2023  10:21 AM   Cholesterol      <200 mg/dL 143    Triglycerides      <150 mg/dL 121    HDL Cholesterol      >=50 mg/dL 50    LDL Cholesterol Calculated      <=100 mg/dL 69    Non HDL Cholesterol      <130 mg/dL 93        OUTSIDE RECORDS  Outside referral notes and chart notes were reviewed and pertinent information has been summarized (in addition to the HPI):-            IMPRESSION/REPORT/PLAN  Spell of altered cognition  Transient amnesia/aphasia  Complicated migraine versus TIA versus seizure  Transient global amnesia less likely  Question of hypoglycemia/diabetes/hyperglycemia    This is a 88 year old female with a spell of altered cognition involving the headache, memory loss and confusion with some lightheadedness and a fall.  MRI brain was negative for acute structural lesions.  CT angiogram did not show any large vessel occlusion/stenosis.  Echocardiogram has not shown any thrombus/PFO.  Would like to do an event monitor given her history of cardiac disease to rule out any other causes for spells.  Will also check a EEG to evaluate for seizures since she does have some family history of seizures.  Most likely working diagnosis would be complicated migraine with TIA and seizure less likely.    She  does not take an aspirin and given the TIA is less likely we will hold off on starting 1.  LDL was appropriate.  There are some questions about her having hypoglycemia/fluctuating blood sugars.  Previous blood work has shown elevated blood sugars.  We can check an A1c or during her annual physical she can get further testing through her primary care provider.    Can see her back in 2 to 3 months to monitor for any recurrence of spells.  Could try taking Tylenol's to see if that helps abort the spells if they happen again.    -     EEG; Future  -     Hemoglobin A1c; Future  -     Cardiac Event Monitor Adult/Pediatric; Future    Return in about 3 months (around 6/22/2024) for In-Clinic Visit (must), After testing, Add on PAOR.    Over 60 minutes were spent coordinating the care for the patient on the day of the encounter.  This includes previsit, during visit and post visit activities as documented above.  Counseling patient.  Reviewing chart.  Testing reviewed/ordered.  High risk.  (Activities include but not inclusive of reviewing chart, reviewing outside records, reviewing labs and imaging study results as well as the images, patient visit time including getting history and exam,  use if applicable, review of test results with the patient and coming up with a plan in a shared model, counseling patient and family, education and answering patient questions, EMR , EMR diagnosis entry and problem list management, medication reconciliation and prescription management if applicable, paperwork if applicable, printing documents and documentation of the visit activities.)        Serge Walden MD  Neurologist  [facility]  Tel:- [phone]    This note was dictated using voice recognition software.  Any grammatical or context distortions are unintentional and inherent to the software.

## 2024-03-22 NOTE — LETTER
3/22/2024         RE: Tayla Benz  [address] [facility] [address]        Dear Colleague,    Thank you for referring your patient, Tayla Benz, to the [facility] [place]. Please see a copy of my visit note below.    NEUROLOGY OUTPATIENT CONSULT NOTE   Mar 22, 2024     CHIEF COMPLAINT/REASON FOR VISIT/REASON FOR CONSULT  Patient presents with:  Consult    REASON FOR CONSULTATION-spell of altered cognition    REFERRAL SOURCE  Dr. Jose Farr  CC Dr. Jose Farr    HISTORY OF PRESENT ILLNESS  Tayla Benz is a 88 year old female seen today for evaluation of a spell of altered cognition.  The spell happened in November.  She was driving.  She did have breakfast that morning.  She often feels hypoglycemic around noon and she had a piece of candy to see if that would help.  Her glucose was normal when she presented to the emergency room.    Her symptoms include not remembering where she was going.  She had complete memory loss.  She felt confused.  Her vision was askew.  Did have a headache.  Headache was global and more of a pressure.  No photophobia or phonophobia.  She was trying to  a package on her porch and tumbled onto the floor.  She feels that she was weak all over.  History regarding lightheadedness is not completely clear.  Was not taking any aspirin.  No chest pain or palpitation.  Symptoms lasted for few hours and then were back to normal.  She had an MRI done in the emergency room that was negative for acute stroke.  She reports no triggers.  Was getting good sleep the night before.  No stress.  No recurrence of her symptoms.  Has not had any headaches or any symptoms like this in the past.    Her mother had a comatose spell in the past and was put on phenobarbital.  She was told that this was related to migraine.    Previous history is reviewed and this is unchanged.    PAST MEDICAL/SURGICAL HISTORY  Past Medical History:    Diagnosis Date     Acute bronchitis      Root esophagus      BPPV (benign paroxysmal positional vertigo)      Carpal tunnel syndrome      Congenital hip dysplasia      Dysphasia      GERD (gastroesophageal reflux disease)      [place] measles      History of transfusion     hip surgery years ago     Hypothyroidism      Murmur      PVC (premature ventricular contraction)      Shingles      Patient Active Problem List   Diagnosis     PVC's (premature ventricular contractions)     Bradycardia   Significant for high cholesterol, arthritis.  Also has a heart murmur.    FAMILY HISTORY  Family History   Problem Relation Age of Onset     Coronary Artery Disease Sister    Mother with a comatose spell thought to be migraine.  Was put on seizure medication.  Brother with memory loss    SOCIAL HISTORY  Social History     Tobacco Use     Smoking status: Never     Smokeless tobacco: Never   Substance Use Topics     Alcohol use: Yes     Comment: Alcoholic Drinks/day: very rare     Drug use: No       SYSTEMS REVIEW  Twelve-system ROS was done and other than the HPI this was negative/positive for arm and leg pain, joint pain, numbness/tingling, difficulty walking, balance/coordination problems, vision symptoms.      MEDICATIONS  atorvastatin (LIPITOR) 20 MG tablet, TAKE 1 TABLET BY MOUTH AT BEDTIME  b complex vitamins tablet, [B COMPLEX VITAMINS TABLET]   coenzyme Q10 (CO Q-10) 100 mg capsule, [COENZYME Q10 (CO Q-10) 100 MG CAPSULE] Take 1 capsule (100 mg total) by mouth 2 (two) times a day.  levothyroxine (SYNTHROID/LEVOTHROID) 50 MCG tablet, Take 50 mcg by mouth daily  loratadine (CLARITIN) 10 mg tablet, [LORATADINE (CLARITIN) 10 MG TABLET] Take 10 mg by mouth daily as needed.   multivitamin with minerals tablet, [MULTIVITAMIN WITH MINERALS TABLET] Take 1 tablet by mouth daily.  omega 3-dha-epa-fish oil 1,000 mg (120 mg-180 mg) cap, [OMEGA 3-DHA-EPA-FISH OIL 1,000 MG (120 MG-180 MG) CAP]   omeprazole (PRILOSEC) 20 MG DR  capsule, Take 1 capsule (20 mg) by mouth 2 times daily  aspirin 81 MG EC tablet, [ASPIRIN 81 MG EC TABLET] Take 1 tablet (81 mg total) by mouth daily. (Patient not taking: Reported on 7/20/2022)  cod liver oil cap, [COD LIVER OIL CAP] Take 1 capsule by mouth daily. (Patient not taking: Reported on 7/20/2022)  famotidine (PEPCID) 10 MG tablet, Take 1 tablet (10 mg) by mouth 2 times daily (Patient not taking: Reported on 1/8/2024)  fluocinonide (LIDEX) 0.05 % cream, [FLUOCINONIDE (LIDEX) 0.05 % CREAM] Apply 1 application topically as needed.  (Patient not taking: Reported on 3/22/2024)  LUTEIN ORAL, [LUTEIN ORAL] as needed.  (Patient not taking: Reported on 7/20/2022)  meclizine (ANTIVERT) 25 mg tablet, [MECLIZINE (ANTIVERT) 25 MG TABLET] Take 1 tablet (25 mg total) by mouth 3 (three) times a day as needed. (Patient not taking: Reported on 1/8/2024)  OMEGA-3/DHA/EPA/FISH OIL (FISH OIL-OMEGA-3 FATTY ACIDS) 300-1,000 mg capsule, [OMEGA-3/DHA/EPA/FISH OIL (FISH OIL-OMEGA-3 FATTY ACIDS) 300-1,000 MG CAPSULE] Take 2 g by mouth daily. (Patient not taking: Reported on 7/20/2022)  psyllium with dextrose (METAMUCIL JAR) powder, [PSYLLIUM WITH DEXTROSE (METAMUCIL JAR) POWDER] Take by mouth daily. (Patient not taking: Reported on 7/20/2022)  ranitidine (ZANTAC) 150 MG capsule, [RANITIDINE (ZANTAC) 150 MG CAPSULE] Take 150 mg by mouth 2 (two) times a day. (Patient not taking: Reported on 7/20/2022)    No current facility-administered medications on file prior to visit.       PHYSICAL EXAMINATION  VITALS: /58   Pulse 84   Resp 16   Wt 56.7 kg (125 lb)   BMI 23.43 kg/m    GENERAL: Healthy appearing, alert, no acute distress, normal habitus.  CARDIOVASCULAR: Extremities warm and well perfused. Pulses present.   NEUROLOGICAL:  Patient is awake and oriented to self, place and time.  Attention span is normal.  Memory is grossly intact.  Language is fluent and follows commands appropriately.  Appropriate fund of knowledge.  Cranial nerves 2-12 are intact. There is no pronator drift.  Motor exam shows 5/5 strength in all extremities.  Tone is symmetric bilaterally in upper and lower extremities.  Reflexes are symmetric and 1+ in upper extremities and lower extremities. Sensory exam is grossly intact to light touch, pin prick and vibration.  Finger to nose and heel to shin is without dysmetria.  Romberg is negative.  Gait is normal and the patient is able to do tandem walk and walk on toes and heels.      DIAGNOSTICS  MRI-images reviewed.  IMPRESSION:  1.  No acute intracranial process.  2.  Generalized brain atrophy and presumed microvascular ischemic changes as detailed above.    CTA  HEAD CT:  1.  Normal head CT.     HEAD CTA:   1.  No large vessel occlusion findings.     NECK CTA:  1.  No acute vascular injury. No hemodynamically significant stenosis in the neck    ECHO  1.Left ventricular size, wall motion and function are normal. The ejection  fraction is > 65%.  2.Mildly decreased right ventricular systolic function  3.There is mild to moderate (1-2+) tricuspid regurgitation.  4.No hemodynamically significant valvular abnormalities on 2D or color flow  imaging.  There is no comparison study available.      RELEVANT LABS  Component      Latest Ref Rn 7/14/2023  10:21 AM 11/28/2023  2:02 PM   WBC      4.0 - 11.0 10e3/uL  7.7    RBC Count      3.80 - 5.20 10e6/uL  4.46    Hemoglobin      11.7 - 15.7 g/dL  14.1    Hematocrit      35.0 - 47.0 %  41.7    MCV      78 - 100 fL  94    MCH      26.5 - 33.0 pg  31.6    MCHC      31.5 - 36.5 g/dL  33.8    RDW      10.0 - 15.0 %  12.4    Platelet Count      150 - 450 10e3/uL  203    % Neutrophils      %  51    % Lymphocytes      %  39    % Monocytes      %  8    % Eosinophils      %  1    % Basophils      %  1    % Immature Granulocytes      %  0    NRBCs per 100 WBC      <1 /100  0    Absolute Neutrophils      1.6 - 8.3 10e3/uL  3.9    Absolute Lymphocytes      0.8 - 5.3 10e3/uL  3.0     Absolute Monocytes      0.0 - 1.3 10e3/uL  0.6    Absolute Eosinophils      0.0 - 0.7 10e3/uL  0.1    Absolute Basophils      0.0 - 0.2 10e3/uL  0.0    Absolute Immature Granulocytes      <=0.4 10e3/uL  0.0    Absolute NRBCs      10e3/uL  0.0    Sodium      135 - 145 mmol/L 141  139    Potassium      3.4 - 5.3 mmol/L 4.0  4.4    Chloride      98 - 107 mmol/L 103  102    Carbon Dioxide (CO2)      22 - 29 mmol/L 28  30 (H)    Anion Gap      7 - 15 mmol/L 10  7    Urea Nitrogen      8.0 - 23.0 mg/dL 14.1  17.6    Creatinine      0.51 - 0.95 mg/dL 0.81  0.81    Calcium      8.8 - 10.2 mg/dL 10.3 (H)  10.2    Glucose      70 - 99 mg/dL 113 (H)  110 (H)    Alkaline Phosphatase      35 - 104 U/L 114 (H)     AST      0 - 45 U/L 25     ALT      0 - 50 U/L 20     Protein Total      6.4 - 8.3 g/dL 6.5     Albumin      3.5 - 5.2 g/dL 4.4     Bilirubin Total      <=1.2 mg/dL 0.5     GFR Estimate      >60 mL/min/1.73m2 70  70    TSH      0.30 - 4.20 uIU/mL 1.86        Legend:  (H) High    Component      Latest Ref Rng 7/14/2023  10:21 AM   Cholesterol      <200 mg/dL 143    Triglycerides      <150 mg/dL 121    HDL Cholesterol      >=50 mg/dL 50    LDL Cholesterol Calculated      <=100 mg/dL 69    Non HDL Cholesterol      <130 mg/dL 93        OUTSIDE RECORDS  Outside referral notes and chart notes were reviewed and pertinent information has been summarized (in addition to the HPI):-            IMPRESSION/REPORT/PLAN  Spell of altered cognition  Transient amnesia/aphasia  Complicated migraine versus TIA versus seizure  Transient global amnesia less likely  Question of hypoglycemia/diabetes/hyperglycemia    This is a 88 year old female with a spell of altered cognition involving the headache, memory loss and confusion with some lightheadedness and a fall.  MRI brain was negative for acute structural lesions.  CT angiogram did not show any large vessel occlusion/stenosis.  Echocardiogram has not shown any thrombus/PFO.  Would like  to do an event monitor given her history of cardiac disease to rule out any other causes for spells.  Will also check a EEG to evaluate for seizures since she does have some family history of seizures.  Most likely working diagnosis would be complicated migraine with TIA and seizure less likely.    She does not take an aspirin and given the TIA is less likely we will hold off on starting 1.  LDL was appropriate.  There are some questions about her having hypoglycemia/fluctuating blood sugars.  Previous blood work has shown elevated blood sugars.  We can check an A1c or during her annual physical she can get further testing through her primary care provider.    Can see her back in 2 to 3 months to monitor for any recurrence of spells.  Could try taking Tylenol's to see if that helps abort the spells if they happen again.    -     EEG; Future  -     Hemoglobin A1c; Future  -     Cardiac Event Monitor Adult/Pediatric; Future    Return in about 3 months (around 6/22/2024) for In-Clinic Visit (must), After testing, Add on PAOR.    Over 60 minutes were spent coordinating the care for the patient on the day of the encounter.  This includes previsit, during visit and post visit activities as documented above.  Counseling patient.  Reviewing chart.  Testing reviewed/ordered.  High risk.  (Activities include but not inclusive of reviewing chart, reviewing outside records, reviewing labs and imaging study results as well as the images, patient visit time including getting history and exam,  use if applicable, review of test results with the patient and coming up with a plan in a shared model, counseling patient and family, education and answering patient questions, EMR , EMR diagnosis entry and problem list management, medication reconciliation and prescription management if applicable, paperwork if applicable, printing documents and documentation of the visit activities.)        Serge Walden,  MD  Neurologist  [facility]  Tel:- [phone]    This note was dictated using voice recognition software.  Any grammatical or context distortions are unintentional and inherent to the software.      Again, thank you for allowing me to participate in the care of your patient.        Sincerely,        Serge Walden MD

## 2024-03-27 ENCOUNTER — HOSPITAL ENCOUNTER (OUTPATIENT)
Dept: CARDIOLOGY | Facility: HOSPITAL | Age: 89
Discharge: HOME | End: 2024-03-27
Attending: PSYCHIATRY & NEUROLOGY | Admitting: PSYCHIATRY & NEUROLOGY
Payer: COMMERCIAL

## 2024-03-27 DIAGNOSIS — G45.9 TIA (TRANSIENT ISCHEMIC ATTACK): ICD-10-CM

## 2024-03-27 DIAGNOSIS — R41.89 SPELL OF ALTERED COGNITION: ICD-10-CM

## 2024-03-27 PROCEDURE — 93270 REMOTE 30 DAY ECG REV/REPORT: CPT

## 2024-04-04 PROCEDURE — 93272 ECG/REVIEW INTERPRET ONLY: CPT | Performed by: INTERNAL MEDICINE

## 2024-05-31 ENCOUNTER — LAB REQUISITION (OUTPATIENT)
Dept: LAB | Facility: CLINIC | Age: 89
End: 2024-05-31

## 2024-05-31 DIAGNOSIS — E78.5 HYPERLIPIDEMIA, UNSPECIFIED: ICD-10-CM

## 2024-05-31 DIAGNOSIS — E03.9 HYPOTHYROIDISM, UNSPECIFIED: ICD-10-CM

## 2024-05-31 DIAGNOSIS — N18.31 CHRONIC KIDNEY DISEASE, STAGE 3A (H): ICD-10-CM

## 2024-05-31 PROCEDURE — 80048 BASIC METABOLIC PNL TOTAL CA: CPT | Performed by: NURSE PRACTITIONER

## 2024-05-31 PROCEDURE — 80061 LIPID PANEL: CPT | Performed by: NURSE PRACTITIONER

## 2024-05-31 PROCEDURE — 84439 ASSAY OF FREE THYROXINE: CPT | Performed by: NURSE PRACTITIONER

## 2024-05-31 PROCEDURE — 84443 ASSAY THYROID STIM HORMONE: CPT | Performed by: NURSE PRACTITIONER

## 2024-06-01 LAB
ANION GAP SERPL CALCULATED.3IONS-SCNC: 12 MMOL/L (ref 7–15)
BUN SERPL-MCNC: 18.7 MG/DL (ref 8–23)
CALCIUM SERPL-MCNC: 10.2 MG/DL (ref 8.8–10.2)
CHLORIDE SERPL-SCNC: 103 MMOL/L (ref 98–107)
CHOLEST SERPL-MCNC: 132 MG/DL
CREAT SERPL-MCNC: 0.8 MG/DL (ref 0.51–0.95)
DEPRECATED HCO3 PLAS-SCNC: 24 MMOL/L (ref 22–29)
EGFRCR SERPLBLD CKD-EPI 2021: 71 ML/MIN/1.73M2
FASTING STATUS PATIENT QL REPORTED: ABNORMAL
GLUCOSE SERPL-MCNC: 181 MG/DL (ref 70–99)
HDLC SERPL-MCNC: 44 MG/DL
LDLC SERPL CALC-MCNC: 56 MG/DL
NONHDLC SERPL-MCNC: 88 MG/DL
POTASSIUM SERPL-SCNC: 3.8 MMOL/L (ref 3.4–5.3)
SODIUM SERPL-SCNC: 139 MMOL/L (ref 135–145)
T4 FREE SERPL-MCNC: 1.48 NG/DL (ref 0.9–1.7)
TRIGL SERPL-MCNC: 161 MG/DL
TSH SERPL DL<=0.005 MIU/L-ACNC: 2.63 UIU/ML (ref 0.3–4.2)

## 2024-08-22 ENCOUNTER — ANCILLARY PROCEDURE (OUTPATIENT)
Dept: NEUROLOGY | Facility: CLINIC | Age: 89
End: 2024-08-22
Attending: PSYCHIATRY & NEUROLOGY
Payer: COMMERCIAL

## 2024-08-22 DIAGNOSIS — R41.89 SPELL OF ALTERED COGNITION: ICD-10-CM

## 2024-08-22 PROCEDURE — 95816 EEG AWAKE AND DROWSY: CPT | Performed by: PSYCHIATRY & NEUROLOGY

## 2024-08-29 ENCOUNTER — LAB REQUISITION (OUTPATIENT)
Dept: LAB | Facility: CLINIC | Age: 89
End: 2024-08-29

## 2024-08-29 DIAGNOSIS — M85.80 OTHER SPECIFIED DISORDERS OF BONE DENSITY AND STRUCTURE, UNSPECIFIED SITE: ICD-10-CM

## 2024-08-29 PROCEDURE — 82040 ASSAY OF SERUM ALBUMIN: CPT | Performed by: NURSE PRACTITIONER

## 2024-08-29 PROCEDURE — 84100 ASSAY OF PHOSPHORUS: CPT | Performed by: NURSE PRACTITIONER

## 2024-08-29 PROCEDURE — 83735 ASSAY OF MAGNESIUM: CPT | Performed by: NURSE PRACTITIONER

## 2024-08-29 PROCEDURE — 84443 ASSAY THYROID STIM HORMONE: CPT | Performed by: NURSE PRACTITIONER

## 2024-08-29 PROCEDURE — 82306 VITAMIN D 25 HYDROXY: CPT | Performed by: NURSE PRACTITIONER

## 2024-08-29 PROCEDURE — 83970 ASSAY OF PARATHORMONE: CPT | Performed by: NURSE PRACTITIONER

## 2024-08-30 LAB
ALBUMIN SERPL BCG-MCNC: 4.3 G/DL (ref 3.5–5.2)
ALP SERPL-CCNC: 271 U/L (ref 40–150)
ALT SERPL W P-5'-P-CCNC: 32 U/L (ref 0–50)
ANION GAP SERPL CALCULATED.3IONS-SCNC: 12 MMOL/L (ref 7–15)
AST SERPL W P-5'-P-CCNC: 32 U/L (ref 0–45)
BILIRUB SERPL-MCNC: 0.5 MG/DL
BUN SERPL-MCNC: 18.1 MG/DL (ref 8–23)
CALCIUM SERPL-MCNC: 10.2 MG/DL (ref 8.8–10.4)
CHLORIDE SERPL-SCNC: 101 MMOL/L (ref 98–107)
CREAT SERPL-MCNC: 0.81 MG/DL (ref 0.51–0.95)
EGFRCR SERPLBLD CKD-EPI 2021: 70 ML/MIN/1.73M2
GLUCOSE SERPL-MCNC: 119 MG/DL (ref 70–99)
HCO3 SERPL-SCNC: 24 MMOL/L (ref 22–29)
MAGNESIUM SERPL-MCNC: 2.1 MG/DL (ref 1.7–2.3)
PHOSPHATE SERPL-MCNC: 3 MG/DL (ref 2.5–4.5)
POTASSIUM SERPL-SCNC: 4.2 MMOL/L (ref 3.4–5.3)
PROT SERPL-MCNC: 7 G/DL (ref 6.4–8.3)
PTH-INTACT SERPL-MCNC: 55 PG/ML (ref 15–65)
SODIUM SERPL-SCNC: 137 MMOL/L (ref 135–145)
TSH SERPL DL<=0.005 MIU/L-ACNC: 1.46 UIU/ML (ref 0.3–4.2)
VIT D+METAB SERPL-MCNC: 38 NG/ML (ref 20–50)

## 2024-09-03 ENCOUNTER — LAB REQUISITION (OUTPATIENT)
Dept: LAB | Facility: CLINIC | Age: 89
End: 2024-09-03

## 2024-09-03 DIAGNOSIS — K21.00 GASTRO-ESOPHAGEAL REFLUX DISEASE WITH ESOPHAGITIS, WITHOUT BLEEDING: ICD-10-CM

## 2024-09-03 PROCEDURE — 87338 HPYLORI STOOL AG IA: CPT | Performed by: FAMILY MEDICINE

## 2024-09-04 LAB — H PYLORI AG STL QL IA: NEGATIVE

## 2024-09-17 ENCOUNTER — LAB REQUISITION (OUTPATIENT)
Dept: LAB | Facility: CLINIC | Age: 89
End: 2024-09-17

## 2024-09-17 DIAGNOSIS — J06.9 ACUTE UPPER RESPIRATORY INFECTION, UNSPECIFIED: ICD-10-CM

## 2024-09-17 DIAGNOSIS — J02.9 ACUTE PHARYNGITIS, UNSPECIFIED: ICD-10-CM

## 2024-09-17 PROCEDURE — 87635 SARS-COV-2 COVID-19 AMP PRB: CPT | Performed by: FAMILY MEDICINE

## 2024-09-17 PROCEDURE — 87081 CULTURE SCREEN ONLY: CPT | Performed by: FAMILY MEDICINE

## 2024-09-18 LAB — SARS-COV-2 RNA RESP QL NAA+PROBE: NEGATIVE

## 2024-09-19 LAB — BACTERIA SPEC CULT: NORMAL

## 2025-01-08 ENCOUNTER — OFFICE VISIT (OUTPATIENT)
Dept: NEUROLOGY | Facility: CLINIC | Age: 89
End: 2025-01-08
Attending: PSYCHIATRY & NEUROLOGY
Payer: COMMERCIAL

## 2025-01-08 VITALS
HEIGHT: 61 IN | BODY MASS INDEX: 22.84 KG/M2 | DIASTOLIC BLOOD PRESSURE: 70 MMHG | HEART RATE: 82 BPM | WEIGHT: 121 LBS | SYSTOLIC BLOOD PRESSURE: 99 MMHG

## 2025-01-08 DIAGNOSIS — G43.109 COMPLICATED MIGRAINE: ICD-10-CM

## 2025-01-08 DIAGNOSIS — G45.9 TIA (TRANSIENT ISCHEMIC ATTACK): ICD-10-CM

## 2025-01-08 DIAGNOSIS — R73.9 HYPERGLYCEMIA: Primary | ICD-10-CM

## 2025-01-08 DIAGNOSIS — R41.3 TRANSIENT AMNESIA: ICD-10-CM

## 2025-01-08 PROCEDURE — 99215 OFFICE O/P EST HI 40 MIN: CPT | Performed by: PSYCHIATRY & NEUROLOGY

## 2025-01-08 NOTE — LETTER
1/8/2025      Tayla Benz  [address] [address]      Dear Colleague,    Thank you for referring your patient, Tayla Benz, to the [facility] [place]. Please see a copy of my visit note below.    NEUROLOGY OUTPATIENT PROGRESS NOTE   Jan 8, 2025     CHIEF COMPLAINT/REASON FOR VISIT/REASON FOR CONSULT  Patient presents with:  Results: EEG results. Patient states she is doing good.     REASON FOR CONSULTATION-spell of altered cognition    REFERRAL SOURCE  Dr. Jose Farr  CC Dr. Jose Farr    HISTORY OF PRESENT ILLNESS  Tayla Benz is a 89 year old female seen today for evaluation of a spell of altered cognition.  The spell happened in November.  She was driving.  She did have breakfast that morning.  She often feels hypoglycemic around noon and she had a piece of candy to see if that would help.  Her glucose was normal when she presented to the emergency room.    Her symptoms include not remembering where she was going.  She had complete memory loss.  She felt confused.  Her vision was askew.  Did have a headache.  Headache was global and more of a pressure.  No photophobia or phonophobia.  She was trying to  a package on her porch and tumbled onto the floor.  She feels that she was weak all over.  History regarding lightheadedness is not completely clear.  Was not taking any aspirin.  No chest pain or palpitation.  Symptoms lasted for few hours and then were back to normal.  She had an MRI done in the emergency room that was negative for acute stroke.  She reports no triggers.  Was getting good sleep the night before.  No stress.  No recurrence of her symptoms.  Has not had any headaches or any symptoms like this in the past.    Her mother had a comatose spell in the past and was put on phenobarbital.  She was told that this was related to migraine.    1/8  Patient returns today.  No headaches.  No spells of lightheadedness.  No  confusion.  No episodes of hypoglycemia.  Has not done the A1c.  Did have some of the blood work done.    Previous history is reviewed and this is unchanged.    PAST MEDICAL/SURGICAL HISTORY  Past Medical History:   Diagnosis Date     Acute bronchitis      Root esophagus      BPPV (benign paroxysmal positional vertigo)      Carpal tunnel syndrome      Congenital hip dysplasia      Dysphasia      GERD (gastroesophageal reflux disease)      [place] measles      History of transfusion     hip surgery years ago     Hypothyroidism      Murmur      PVC (premature ventricular contraction)      Shingles      Patient Active Problem List   Diagnosis     PVC's (premature ventricular contractions)     Bradycardia   Significant for high cholesterol, arthritis.  Also has a heart murmur.    FAMILY HISTORY  Family History   Problem Relation Age of Onset     Coronary Artery Disease Sister    Mother with a comatose spell thought to be migraine.  Was put on seizure medication.  Brother with memory loss    SOCIAL HISTORY  Social History     Tobacco Use     Smoking status: Never     Smokeless tobacco: Never   Substance Use Topics     Alcohol use: Yes     Comment: Alcoholic Drinks/day: very rare     Drug use: No       SYSTEMS REVIEW  Twelve-system ROS was done and other than the HPI this was negative/positive for arm and leg pain, joint pain, numbness/tingling, difficulty walking, balance/coordination problems, vision symptoms.      MEDICATIONS  Current Outpatient Medications   Medication Sig Dispense Refill     atorvastatin (LIPITOR) 20 MG tablet TAKE 1 TABLET BY MOUTH AT BEDTIME 90 tablet 3     b complex vitamins tablet [B COMPLEX VITAMINS TABLET]        coenzyme Q10 (CO Q-10) 100 mg capsule [COENZYME Q10 (CO Q-10) 100 MG CAPSULE] Take 1 capsule (100 mg total) by mouth 2 (two) times a day.  0     levothyroxine (SYNTHROID/LEVOTHROID) 50 MCG tablet Take 50 mcg by mouth daily       loratadine (CLARITIN) 10 mg tablet [LORATADINE  "(CLARITIN) 10 MG TABLET] Take 10 mg by mouth daily as needed.        multivitamin with minerals tablet [MULTIVITAMIN WITH MINERALS TABLET] Take 1 tablet by mouth daily.       omega 3-dha-epa-fish oil 1,000 mg (120 mg-180 mg) cap [OMEGA 3-DHA-EPA-FISH OIL 1,000 MG (120 MG-180 MG) CAP]        omeprazole (PRILOSEC) 20 MG DR capsule Take 1 capsule (20 mg) by mouth 2 times daily 20 capsule 0     meclizine (ANTIVERT) 25 mg tablet [MECLIZINE (ANTIVERT) 25 MG TABLET] Take 1 tablet (25 mg total) by mouth 3 (three) times a day as needed. (Patient not taking: Reported on 1/8/2025) 30 tablet 0     psyllium with dextrose (METAMUCIL JAR) powder [PSYLLIUM WITH DEXTROSE (METAMUCIL JAR) POWDER] Take by mouth daily. (Patient not taking: Reported on 1/8/2025)       No current facility-administered medications for this visit.        PHYSICAL EXAMINATION  VITALS: BP 99/70 (BP Location: Right arm, Patient Position: Sitting)   Pulse 82   Ht 1.556 m (5' 1.25\")   Wt 54.9 kg (121 lb)   BMI 22.68 kg/m    GENERAL: Healthy appearing, alert, no acute distress, normal habitus.  CARDIOVASCULAR: Extremities warm and well perfused. Pulses present.   NEUROLOGICAL:  Patient is awake and oriented to self, place and time.  Attention span is normal.  Memory is grossly intact.  Language is fluent and follows commands appropriately.  Appropriate fund of knowledge. Cranial nerves 2-12 are intact. There is no pronator drift.  Motor exam shows 5/5 strength in all extremities.  Tone is symmetric bilaterally in upper and lower extremities.  Reflexes are symmetric and 1+ in upper extremities and lower extremities. Sensory exam is grossly intact to light touch, pin prick and vibration.  Finger to nose and heel to shin is without dysmetria.  Romberg is negative.  Gait is normal and the patient is able to do tandem walk and walk on toes and heels.  Exam stable.      DIAGNOSTICS  MRI-images reviewed.  IMPRESSION:  1.  No acute intracranial process.  2.  " Generalized brain atrophy and presumed microvascular ischemic changes as detailed above.    CTA  HEAD CT:  1.  Normal head CT.     HEAD CTA:   1.  No large vessel occlusion findings.     NECK CTA:  1.  No acute vascular injury. No hemodynamically significant stenosis in the neck    ECHO  1.Left ventricular size, wall motion and function are normal. The ejection  fraction is > 65%.  2.Mildly decreased right ventricular systolic function  3.There is mild to moderate (1-2+) tricuspid regurgitation.  4.No hemodynamically significant valvular abnormalities on 2D or color flow  imaging.  There is no comparison study available.      RELEVANT LABS  Component      Latest Ref Rng 7/14/2023  10:21 AM 11/28/2023  2:02 PM   WBC      4.0 - 11.0 10e3/uL  7.7    RBC Count      3.80 - 5.20 10e6/uL  4.46    Hemoglobin      11.7 - 15.7 g/dL  14.1    Hematocrit      35.0 - 47.0 %  41.7    MCV      78 - 100 fL  94    MCH      26.5 - 33.0 pg  31.6    MCHC      31.5 - 36.5 g/dL  33.8    RDW      10.0 - 15.0 %  12.4    Platelet Count      150 - 450 10e3/uL  203    % Neutrophils      %  51    % Lymphocytes      %  39    % Monocytes      %  8    % Eosinophils      %  1    % Basophils      %  1    % Immature Granulocytes      %  0    NRBCs per 100 WBC      <1 /100  0    Absolute Neutrophils      1.6 - 8.3 10e3/uL  3.9    Absolute Lymphocytes      0.8 - 5.3 10e3/uL  3.0    Absolute Monocytes      0.0 - 1.3 10e3/uL  0.6    Absolute Eosinophils      0.0 - 0.7 10e3/uL  0.1    Absolute Basophils      0.0 - 0.2 10e3/uL  0.0    Absolute Immature Granulocytes      <=0.4 10e3/uL  0.0    Absolute NRBCs      10e3/uL  0.0    Sodium      135 - 145 mmol/L 141  139    Potassium      3.4 - 5.3 mmol/L 4.0  4.4    Chloride      98 - 107 mmol/L 103  102    Carbon Dioxide (CO2)      22 - 29 mmol/L 28  30 (H)    Anion Gap      7 - 15 mmol/L 10  7    Urea Nitrogen      8.0 - 23.0 mg/dL 14.1  17.6    Creatinine      0.51 - 0.95 mg/dL 0.81  0.81    Calcium      8.8  - 10.2 mg/dL 10.3 (H)  10.2    Glucose      70 - 99 mg/dL 113 (H)  110 (H)    Alkaline Phosphatase      35 - 104 U/L 114 (H)     AST      0 - 45 U/L 25     ALT      0 - 50 U/L 20     Protein Total      6.4 - 8.3 g/dL 6.5     Albumin      3.5 - 5.2 g/dL 4.4     Bilirubin Total      <=1.2 mg/dL 0.5     GFR Estimate      >60 mL/min/1.73m2 70  70    TSH      0.30 - 4.20 uIU/mL 1.86        Legend:  (H) High    Component      Latest Ref Rng 7/14/2023  10:21 AM   Cholesterol      <200 mg/dL 143    Triglycerides      <150 mg/dL 121    HDL Cholesterol      >=50 mg/dL 50    LDL Cholesterol Calculated      <=100 mg/dL 69    Non HDL Cholesterol      <130 mg/dL 93        OUTSIDE RECORDS  Outside referral notes and chart notes were reviewed and pertinent information has been summarized (in addition to the HPI):-          EEG  IMPRESSION/REPORT/PLAN  This is a normal EEG during wakefulness and drowsiness. Further clinical correlation is needed.     Please note that the absence of epileptiform abnormalities does not exclude the possibility of epilepsy in any patient.                  IMPRESSION/REPORT/PLAN  Spell of altered cognition  Transient amnesia/aphasia  Complicated migraine versus TIA versus seizure  Transient global amnesia less likely  Question of hypoglycemia/diabetes/hyperglycemia    This is a 89 year old female with a spell of altered cognition involving the headache, memory loss and confusion with some lightheadedness and a fall.  MRI brain was negative for acute structural lesions.  CT angiogram did not show any large vessel occlusion/stenosis.  Echocardiogram has not shown any thrombus/PFO.  Event monitor was negative for atrial fibrillation.  EEG was negative for epilepsy.  Discussed limitations.  Hold off on seizure medication for right now.  There is some family history of seizures.  Most likely working diagnosis would be complicated migraine with TIA and seizure less likely.  Discussed prognosis.    She does not  take an aspirin and given the TIA is less likely we will hold off on starting 1.  LDL was appropriate.  There are some questions about her having hypoglycemia/fluctuating blood sugars.  Previous blood work has shown elevated blood sugars.  A1c is pending.    Could try taking Tylenol's to see if that helps abort the spells if they happen again.    I can see her back on as-needed basis.  Discussed lifestyle changes/improved changes in diet to help with prediabetes.      -     Hemoglobin A1c; Future    Return if symptoms worsen or fail to improve, for In-Clinic Visit (must).    Over 40 minutes were spent coordinating the care for the patient on the day of the encounter.  This includes previsit, during visit and post visit activities as documented above.  Counseling patient.  High risk.  Multiple test reviewed.  Counseling regarding prediabetes/lifestyle changes  (Activities include but not inclusive of reviewing chart, reviewing outside records, reviewing labs and imaging study results as well as the images, patient visit time including getting history and exam,  use if applicable, review of test results with the patient and coming up with a plan in a shared model, counseling patient and family, education and answering patient questions, EMR , EMR diagnosis entry and problem list management, medication reconciliation and prescription management if applicable, paperwork if applicable, printing documents and documentation of the visit activities.)      Serge Walden MD  Neurologist  [facility]  Tel:- [phone]    This note was dictated using voice recognition software.  Any grammatical or context distortions are unintentional and inherent to the software.      Again, thank you for allowing me to participate in the care of your patient.        Sincerely,        Srege Walden MD    Electronically signed

## 2025-01-08 NOTE — PROGRESS NOTES
NEUROLOGY OUTPATIENT PROGRESS NOTE   Jan 8, 2025     CHIEF COMPLAINT/REASON FOR VISIT/REASON FOR CONSULT  Patient presents with:  Results: EEG results. Patient states she is doing good.     REASON FOR CONSULTATION-spell of altered cognition    REFERRAL SOURCE  Dr. Jose Farr   Dr. Jose Farr    HISTORY OF PRESENT ILLNESS  Tayla Benz is a 89 year old female seen today for evaluation of a spell of altered cognition.  The spell happened in November.  She was driving.  She did have breakfast that morning.  She often feels hypoglycemic around noon and she had a piece of candy to see if that would help.  Her glucose was normal when she presented to the emergency room.    Her symptoms include not remembering where she was going.  She had complete memory loss.  She felt confused.  Her vision was askew.  Did have a headache.  Headache was global and more of a pressure.  No photophobia or phonophobia.  She was trying to  a package on her porch and tumbled onto the floor.  She feels that she was weak all over.  History regarding lightheadedness is not completely clear.  Was not taking any aspirin.  No chest pain or palpitation.  Symptoms lasted for few hours and then were back to normal.  She had an MRI done in the emergency room that was negative for acute stroke.  She reports no triggers.  Was getting good sleep the night before.  No stress.  No recurrence of her symptoms.  Has not had any headaches or any symptoms like this in the past.    Her mother had a comatose spell in the past and was put on phenobarbital.  She was told that this was related to migraine.    1/8  Patient returns today.  No headaches.  No spells of lightheadedness.  No confusion.  No episodes of hypoglycemia.  Has not done the A1c.  Did have some of the blood work done.    Previous history is reviewed and this is unchanged.    PAST MEDICAL/SURGICAL HISTORY  Past Medical History:   Diagnosis Date    Acute bronchitis      Root esophagus     BPPV (benign paroxysmal positional vertigo)     Carpal tunnel syndrome     Congenital hip dysplasia     Dysphasia     GERD (gastroesophageal reflux disease)     Cymro measles     History of transfusion     hip surgery years ago    Hypothyroidism     Murmur     PVC (premature ventricular contraction)     Shingles      Patient Active Problem List   Diagnosis    PVC's (premature ventricular contractions)    Bradycardia   Significant for high cholesterol, arthritis.  Also has a heart murmur.    FAMILY HISTORY  Family History   Problem Relation Age of Onset    Coronary Artery Disease Sister    Mother with a comatose spell thought to be migraine.  Was put on seizure medication.  Brother with memory loss    SOCIAL HISTORY  Social History     Tobacco Use    Smoking status: Never    Smokeless tobacco: Never   Substance Use Topics    Alcohol use: Yes     Comment: Alcoholic Drinks/day: very rare    Drug use: No       SYSTEMS REVIEW  Twelve-system ROS was done and other than the HPI this was negative/positive for arm and leg pain, joint pain, numbness/tingling, difficulty walking, balance/coordination problems, vision symptoms.      MEDICATIONS  Current Outpatient Medications   Medication Sig Dispense Refill    atorvastatin (LIPITOR) 20 MG tablet TAKE 1 TABLET BY MOUTH AT BEDTIME 90 tablet 3    b complex vitamins tablet [B COMPLEX VITAMINS TABLET]       coenzyme Q10 (CO Q-10) 100 mg capsule [COENZYME Q10 (CO Q-10) 100 MG CAPSULE] Take 1 capsule (100 mg total) by mouth 2 (two) times a day.  0    levothyroxine (SYNTHROID/LEVOTHROID) 50 MCG tablet Take 50 mcg by mouth daily      loratadine (CLARITIN) 10 mg tablet [LORATADINE (CLARITIN) 10 MG TABLET] Take 10 mg by mouth daily as needed.       multivitamin with minerals tablet [MULTIVITAMIN WITH MINERALS TABLET] Take 1 tablet by mouth daily.      omega 3-dha-epa-fish oil 1,000 mg (120 mg-180 mg) cap [OMEGA 3-DHA-EPA-FISH OIL 1,000 MG (120 MG-180 MG) CAP]        "omeprazole (PRILOSEC) 20 MG DR capsule Take 1 capsule (20 mg) by mouth 2 times daily 20 capsule 0    meclizine (ANTIVERT) 25 mg tablet [MECLIZINE (ANTIVERT) 25 MG TABLET] Take 1 tablet (25 mg total) by mouth 3 (three) times a day as needed. (Patient not taking: Reported on 1/8/2025) 30 tablet 0    psyllium with dextrose (METAMUCIL JAR) powder [PSYLLIUM WITH DEXTROSE (METAMUCIL JAR) POWDER] Take by mouth daily. (Patient not taking: Reported on 1/8/2025)       No current facility-administered medications for this visit.        PHYSICAL EXAMINATION  VITALS: BP 99/70 (BP Location: Right arm, Patient Position: Sitting)   Pulse 82   Ht 1.556 m (5' 1.25\")   Wt 54.9 kg (121 lb)   BMI 22.68 kg/m    GENERAL: Healthy appearing, alert, no acute distress, normal habitus.  CARDIOVASCULAR: Extremities warm and well perfused. Pulses present.   NEUROLOGICAL:  Patient is awake and oriented to self, place and time.  Attention span is normal.  Memory is grossly intact.  Language is fluent and follows commands appropriately.  Appropriate fund of knowledge. Cranial nerves 2-12 are intact. There is no pronator drift.  Motor exam shows 5/5 strength in all extremities.  Tone is symmetric bilaterally in upper and lower extremities.  Reflexes are symmetric and 1+ in upper extremities and lower extremities. Sensory exam is grossly intact to light touch, pin prick and vibration.  Finger to nose and heel to shin is without dysmetria.  Romberg is negative.  Gait is normal and the patient is able to do tandem walk and walk on toes and heels.  Exam stable.      DIAGNOSTICS  MRI-images reviewed.  IMPRESSION:  1.  No acute intracranial process.  2.  Generalized brain atrophy and presumed microvascular ischemic changes as detailed above.    CTA  HEAD CT:  1.  Normal head CT.     HEAD CTA:   1.  No large vessel occlusion findings.     NECK CTA:  1.  No acute vascular injury. No hemodynamically significant stenosis in the neck    ECHO  1.Left " ventricular size, wall motion and function are normal. The ejection  fraction is > 65%.  2.Mildly decreased right ventricular systolic function  3.There is mild to moderate (1-2+) tricuspid regurgitation.  4.No hemodynamically significant valvular abnormalities on 2D or color flow  imaging.  There is no comparison study available.      RELEVANT LABS  Component      Latest Ref Rn 7/14/2023  10:21 AM 11/28/2023  2:02 PM   WBC      4.0 - 11.0 10e3/uL  7.7    RBC Count      3.80 - 5.20 10e6/uL  4.46    Hemoglobin      11.7 - 15.7 g/dL  14.1    Hematocrit      35.0 - 47.0 %  41.7    MCV      78 - 100 fL  94    MCH      26.5 - 33.0 pg  31.6    MCHC      31.5 - 36.5 g/dL  33.8    RDW      10.0 - 15.0 %  12.4    Platelet Count      150 - 450 10e3/uL  203    % Neutrophils      %  51    % Lymphocytes      %  39    % Monocytes      %  8    % Eosinophils      %  1    % Basophils      %  1    % Immature Granulocytes      %  0    NRBCs per 100 WBC      <1 /100  0    Absolute Neutrophils      1.6 - 8.3 10e3/uL  3.9    Absolute Lymphocytes      0.8 - 5.3 10e3/uL  3.0    Absolute Monocytes      0.0 - 1.3 10e3/uL  0.6    Absolute Eosinophils      0.0 - 0.7 10e3/uL  0.1    Absolute Basophils      0.0 - 0.2 10e3/uL  0.0    Absolute Immature Granulocytes      <=0.4 10e3/uL  0.0    Absolute NRBCs      10e3/uL  0.0    Sodium      135 - 145 mmol/L 141  139    Potassium      3.4 - 5.3 mmol/L 4.0  4.4    Chloride      98 - 107 mmol/L 103  102    Carbon Dioxide (CO2)      22 - 29 mmol/L 28  30 (H)    Anion Gap      7 - 15 mmol/L 10  7    Urea Nitrogen      8.0 - 23.0 mg/dL 14.1  17.6    Creatinine      0.51 - 0.95 mg/dL 0.81  0.81    Calcium      8.8 - 10.2 mg/dL 10.3 (H)  10.2    Glucose      70 - 99 mg/dL 113 (H)  110 (H)    Alkaline Phosphatase      35 - 104 U/L 114 (H)     AST      0 - 45 U/L 25     ALT      0 - 50 U/L 20     Protein Total      6.4 - 8.3 g/dL 6.5     Albumin      3.5 - 5.2 g/dL 4.4     Bilirubin Total      <=1.2 mg/dL  0.5     GFR Estimate      >60 mL/min/1.73m2 70  70    TSH      0.30 - 4.20 uIU/mL 1.86        Legend:  (H) High    Component      Latest Ref Rng 7/14/2023  10:21 AM   Cholesterol      <200 mg/dL 143    Triglycerides      <150 mg/dL 121    HDL Cholesterol      >=50 mg/dL 50    LDL Cholesterol Calculated      <=100 mg/dL 69    Non HDL Cholesterol      <130 mg/dL 93        OUTSIDE RECORDS  Outside referral notes and chart notes were reviewed and pertinent information has been summarized (in addition to the HPI):-          EEG  IMPRESSION/REPORT/PLAN  This is a normal EEG during wakefulness and drowsiness. Further clinical correlation is needed.     Please note that the absence of epileptiform abnormalities does not exclude the possibility of epilepsy in any patient.                  IMPRESSION/REPORT/PLAN  Spell of altered cognition  Transient amnesia/aphasia  Complicated migraine versus TIA versus seizure  Transient global amnesia less likely  Question of hypoglycemia/diabetes/hyperglycemia    This is a 89 year old female with a spell of altered cognition involving the headache, memory loss and confusion with some lightheadedness and a fall.  MRI brain was negative for acute structural lesions.  CT angiogram did not show any large vessel occlusion/stenosis.  Echocardiogram has not shown any thrombus/PFO.  Event monitor was negative for atrial fibrillation.  EEG was negative for epilepsy.  Discussed limitations.  Hold off on seizure medication for right now.  There is some family history of seizures.  Most likely working diagnosis would be complicated migraine with TIA and seizure less likely.  Discussed prognosis.    She does not take an aspirin and given the TIA is less likely we will hold off on starting 1.  LDL was appropriate.  There are some questions about her having hypoglycemia/fluctuating blood sugars.  Previous blood work has shown elevated blood sugars.  A1c is pending.    Could try taking Tylenol's to see  if that helps abort the spells if they happen again.    I can see her back on as-needed basis.  Discussed lifestyle changes/improved changes in diet to help with prediabetes.      -     Hemoglobin A1c; Future    Return if symptoms worsen or fail to improve, for In-Clinic Visit (must).    Over 40 minutes were spent coordinating the care for the patient on the day of the encounter.  This includes previsit, during visit and post visit activities as documented above.  Counseling patient.  High risk.  Multiple test reviewed.  Counseling regarding prediabetes/lifestyle changes  (Activities include but not inclusive of reviewing chart, reviewing outside records, reviewing labs and imaging study results as well as the images, patient visit time including getting history and exam,  use if applicable, review of test results with the patient and coming up with a plan in a shared model, counseling patient and family, education and answering patient questions, EMR , EMR diagnosis entry and problem list management, medication reconciliation and prescription management if applicable, paperwork if applicable, printing documents and documentation of the visit activities.)      Serge Walden MD  Neurologist  [facility]  Tel:- [phone]    This note was dictated using voice recognition software.  Any grammatical or context distortions are unintentional and inherent to the software.

## 2025-01-08 NOTE — NURSING NOTE
Chief Complaint   Patient presents with    Results     EEG results. Patient states she is doing good.      Allie Morgan MA

## 2025-04-17 ENCOUNTER — LAB REQUISITION (OUTPATIENT)
Dept: LAB | Facility: CLINIC | Age: 89
End: 2025-04-17
Payer: COMMERCIAL

## 2025-04-17 DIAGNOSIS — R53.81 OTHER MALAISE: ICD-10-CM

## 2025-04-17 LAB
TSH SERPL DL<=0.005 MIU/L-ACNC: 2.1 UIU/ML (ref 0.3–4.2)
VIT B12 SERPL-MCNC: 799 PG/ML (ref 232–1245)

## 2025-04-17 PROCEDURE — 84443 ASSAY THYROID STIM HORMONE: CPT | Mod: ORL | Performed by: FAMILY MEDICINE

## 2025-04-17 PROCEDURE — 82607 VITAMIN B-12: CPT | Mod: ORL | Performed by: FAMILY MEDICINE

## 2025-07-10 ENCOUNTER — LAB REQUISITION (OUTPATIENT)
Dept: LAB | Facility: CLINIC | Age: 89
End: 2025-07-10
Payer: COMMERCIAL

## 2025-07-10 DIAGNOSIS — E78.5 HYPERLIPIDEMIA, UNSPECIFIED: ICD-10-CM

## 2025-07-10 LAB
ALBUMIN SERPL BCG-MCNC: 4.3 G/DL (ref 3.5–5.2)
ALP SERPL-CCNC: 115 U/L (ref 40–150)
ALT SERPL W P-5'-P-CCNC: 19 U/L (ref 0–50)
ANION GAP SERPL CALCULATED.3IONS-SCNC: 10 MMOL/L (ref 7–15)
AST SERPL W P-5'-P-CCNC: 26 U/L (ref 0–45)
BILIRUB SERPL-MCNC: 0.6 MG/DL
BUN SERPL-MCNC: 17.2 MG/DL (ref 8–23)
CALCIUM SERPL-MCNC: 10.3 MG/DL (ref 8.8–10.4)
CHLORIDE SERPL-SCNC: 102 MMOL/L (ref 98–107)
CHOLEST SERPL-MCNC: 151 MG/DL
CREAT SERPL-MCNC: 0.85 MG/DL (ref 0.51–0.95)
EGFRCR SERPLBLD CKD-EPI 2021: 66 ML/MIN/1.73M2
FASTING STATUS PATIENT QL REPORTED: NO
FASTING STATUS PATIENT QL REPORTED: NO
GLUCOSE SERPL-MCNC: 85 MG/DL (ref 70–99)
HCO3 SERPL-SCNC: 28 MMOL/L (ref 22–29)
HDLC SERPL-MCNC: 61 MG/DL
LDLC SERPL CALC-MCNC: 65 MG/DL
NONHDLC SERPL-MCNC: 90 MG/DL
POTASSIUM SERPL-SCNC: 4 MMOL/L (ref 3.4–5.3)
PROT SERPL-MCNC: 6.9 G/DL (ref 6.4–8.3)
SODIUM SERPL-SCNC: 140 MMOL/L (ref 135–145)
TRIGL SERPL-MCNC: 124 MG/DL

## 2025-07-10 PROCEDURE — 80053 COMPREHEN METABOLIC PANEL: CPT | Mod: ORL | Performed by: FAMILY MEDICINE

## 2025-07-10 PROCEDURE — 80061 LIPID PANEL: CPT | Mod: ORL | Performed by: FAMILY MEDICINE
